# Patient Record
Sex: MALE | Race: WHITE | NOT HISPANIC OR LATINO
[De-identification: names, ages, dates, MRNs, and addresses within clinical notes are randomized per-mention and may not be internally consistent; named-entity substitution may affect disease eponyms.]

---

## 2017-10-25 ENCOUNTER — LABORATORY RESULT (OUTPATIENT)
Age: 67
End: 2017-10-25

## 2017-10-25 ENCOUNTER — APPOINTMENT (OUTPATIENT)
Dept: FAMILY MEDICINE | Facility: CLINIC | Age: 67
End: 2017-10-25
Payer: MEDICARE

## 2017-10-25 ENCOUNTER — NON-APPOINTMENT (OUTPATIENT)
Age: 67
End: 2017-10-25

## 2017-10-25 VITALS
SYSTOLIC BLOOD PRESSURE: 130 MMHG | OXYGEN SATURATION: 96 % | BODY MASS INDEX: 18.16 KG/M2 | HEIGHT: 73 IN | HEART RATE: 67 BPM | WEIGHT: 137 LBS | DIASTOLIC BLOOD PRESSURE: 76 MMHG

## 2017-10-25 DIAGNOSIS — Z85.118 PERSONAL HISTORY OF OTHER MALIGNANT NEOPLASM OF BRONCHUS AND LUNG: ICD-10-CM

## 2017-10-25 DIAGNOSIS — F10.10 ALCOHOL ABUSE, UNCOMPLICATED: ICD-10-CM

## 2017-10-25 DIAGNOSIS — Z87.898 PERSONAL HISTORY OF OTHER SPECIFIED CONDITIONS: ICD-10-CM

## 2017-10-25 DIAGNOSIS — Z82.49 FAMILY HISTORY OF ISCHEMIC HEART DISEASE AND OTHER DISEASES OF THE CIRCULATORY SYSTEM: ICD-10-CM

## 2017-10-25 DIAGNOSIS — Z13.21 ENCOUNTER FOR SCREENING FOR NUTRITIONAL DISORDER: ICD-10-CM

## 2017-10-25 DIAGNOSIS — Z86.69 PERSONAL HISTORY OF OTHER DISEASES OF THE NERVOUS SYSTEM AND SENSE ORGANS: ICD-10-CM

## 2017-10-25 PROCEDURE — 90688 IIV4 VACCINE SPLT 0.5 ML IM: CPT

## 2017-10-25 PROCEDURE — 36415 COLL VENOUS BLD VENIPUNCTURE: CPT

## 2017-10-25 PROCEDURE — 93000 ELECTROCARDIOGRAM COMPLETE: CPT

## 2017-10-25 PROCEDURE — G0008: CPT

## 2017-10-25 PROCEDURE — 99204 OFFICE O/P NEW MOD 45 MIN: CPT | Mod: 25

## 2017-10-26 ENCOUNTER — OTHER (OUTPATIENT)
Age: 67
End: 2017-10-26

## 2017-10-26 LAB
25(OH)D3 SERPL-MCNC: 34.9 NG/ML
ALBUMIN SERPL ELPH-MCNC: 4.7 G/DL
ALP BLD-CCNC: 145 U/L
ALT SERPL-CCNC: 11 U/L
ANION GAP SERPL CALC-SCNC: 16 MMOL/L
APPEARANCE: CLEAR
AST SERPL-CCNC: 18 U/L
BACTERIA: NEGATIVE
BASOPHILS # BLD AUTO: 0.02 K/UL
BASOPHILS NFR BLD AUTO: 0.3 %
BILIRUB SERPL-MCNC: 0.3 MG/DL
BILIRUBIN URINE: NEGATIVE
BLOOD URINE: NEGATIVE
BUN SERPL-MCNC: 8 MG/DL
CALCIUM SERPL-MCNC: 9.7 MG/DL
CHLORIDE SERPL-SCNC: 99 MMOL/L
CHOLEST SERPL-MCNC: 166 MG/DL
CHOLEST/HDLC SERPL: 2.8 RATIO
CO2 SERPL-SCNC: 22 MMOL/L
COLOR: YELLOW
CREAT SERPL-MCNC: 0.92 MG/DL
EOSINOPHIL # BLD AUTO: 0.11 K/UL
EOSINOPHIL NFR BLD AUTO: 1.5 %
GLUCOSE QUALITATIVE U: NEGATIVE MG/DL
GLUCOSE SERPL-MCNC: 103 MG/DL
HBA1C MFR BLD HPLC: 5.1 %
HCT VFR BLD CALC: 39.1 %
HDLC SERPL-MCNC: 60 MG/DL
HGB BLD-MCNC: 12.8 G/DL
HYALINE CASTS: 5 /LPF
IMM GRANULOCYTES NFR BLD AUTO: 0.3 %
KETONES URINE: NEGATIVE
LDLC SERPL CALC-MCNC: 77 MG/DL
LEUKOCYTE ESTERASE URINE: NEGATIVE
LYMPHOCYTES # BLD AUTO: 3.1 K/UL
LYMPHOCYTES NFR BLD AUTO: 43.4 %
MAN DIFF?: NORMAL
MCHC RBC-ENTMCNC: 32.7 GM/DL
MCHC RBC-ENTMCNC: 35.5 PG
MCV RBC AUTO: 108.3 FL
MICROSCOPIC-UA: NORMAL
MONOCYTES # BLD AUTO: 0.55 K/UL
MONOCYTES NFR BLD AUTO: 7.7 %
NEUTROPHILS # BLD AUTO: 3.35 K/UL
NEUTROPHILS NFR BLD AUTO: 46.8 %
NITRITE URINE: NEGATIVE
PH URINE: 5.5
PLATELET # BLD AUTO: 261 K/UL
POTASSIUM SERPL-SCNC: 4.8 MMOL/L
PROT SERPL-MCNC: 7.7 G/DL
PROTEIN URINE: NEGATIVE MG/DL
RBC # BLD: 3.61 M/UL
RBC # FLD: 14 %
RED BLOOD CELLS URINE: 1 /HPF
SODIUM SERPL-SCNC: 137 MMOL/L
SPECIFIC GRAVITY URINE: 1.02
SQUAMOUS EPITHELIAL CELLS: 0 /HPF
TRIGL SERPL-MCNC: 145 MG/DL
UROBILINOGEN URINE: NEGATIVE MG/DL
WBC # FLD AUTO: 7.15 K/UL
WHITE BLOOD CELLS URINE: 0 /HPF

## 2017-10-27 LAB
PSA FREE FLD-MCNC: 33.3
PSA FREE SERPL-MCNC: 0.57 NG/ML
PSA SERPL-MCNC: 1.71 NG/ML
TSH SERPL-ACNC: 1.24 UIU/ML

## 2017-11-03 ENCOUNTER — LABORATORY RESULT (OUTPATIENT)
Age: 67
End: 2017-11-03

## 2017-11-03 ENCOUNTER — APPOINTMENT (OUTPATIENT)
Dept: FAMILY MEDICINE | Facility: CLINIC | Age: 67
End: 2017-11-03
Payer: MEDICARE

## 2017-11-03 VITALS
OXYGEN SATURATION: 97 % | BODY MASS INDEX: 18.29 KG/M2 | HEART RATE: 76 BPM | SYSTOLIC BLOOD PRESSURE: 120 MMHG | HEIGHT: 73 IN | DIASTOLIC BLOOD PRESSURE: 70 MMHG | WEIGHT: 138 LBS | TEMPERATURE: 98 F

## 2017-11-03 PROCEDURE — 36415 COLL VENOUS BLD VENIPUNCTURE: CPT

## 2017-11-03 PROCEDURE — 99214 OFFICE O/P EST MOD 30 MIN: CPT | Mod: 25

## 2017-11-06 ENCOUNTER — APPOINTMENT (OUTPATIENT)
Dept: THORACIC SURGERY | Facility: CLINIC | Age: 67
End: 2017-11-06
Payer: MEDICARE

## 2017-11-06 VITALS
OXYGEN SATURATION: 95 % | RESPIRATION RATE: 16 BRPM | BODY MASS INDEX: 18.42 KG/M2 | DIASTOLIC BLOOD PRESSURE: 70 MMHG | WEIGHT: 139 LBS | HEIGHT: 73 IN | HEART RATE: 79 BPM | SYSTOLIC BLOOD PRESSURE: 108 MMHG

## 2017-11-06 PROCEDURE — 99205 OFFICE O/P NEW HI 60 MIN: CPT

## 2017-11-08 ENCOUNTER — OTHER (OUTPATIENT)
Age: 67
End: 2017-11-08

## 2017-11-08 ENCOUNTER — NON-APPOINTMENT (OUTPATIENT)
Age: 67
End: 2017-11-08

## 2017-11-08 ENCOUNTER — APPOINTMENT (OUTPATIENT)
Dept: CARDIOLOGY | Facility: CLINIC | Age: 67
End: 2017-11-08
Payer: MEDICARE

## 2017-11-08 VITALS
BODY MASS INDEX: 18.21 KG/M2 | SYSTOLIC BLOOD PRESSURE: 120 MMHG | WEIGHT: 138 LBS | OXYGEN SATURATION: 97 % | HEART RATE: 83 BPM | DIASTOLIC BLOOD PRESSURE: 79 MMHG

## 2017-11-08 LAB
ALP BLD-CCNC: 136 U/L
ALP BLD-CCNC: 142 U/L
ALP BONE CFR SERPL: 23 %
ALP INTEST CFR SERPL: 6 %
ALP LIVER CFR SERPL: 72 %
ALP MACRO CFR SERPL: 0 %
ALP PLAC CFR SERPL: 0 %
BASOPHILS # BLD AUTO: 0 K/UL
BASOPHILS NFR BLD AUTO: 0 %
EOSINOPHIL # BLD AUTO: 0.18 K/UL
EOSINOPHIL NFR BLD AUTO: 2.7 %
FERRITIN SERPL-MCNC: 66 NG/ML
FOLATE SERPL-MCNC: >20 NG/ML
HCT VFR BLD CALC: 37.3 %
HGB BLD-MCNC: 12.2 G/DL
IRON SATN MFR SERPL: 67 %
IRON SERPL-MCNC: 143 UG/DL
LYMPHOCYTES # BLD AUTO: 2.27 K/UL
LYMPHOCYTES NFR BLD AUTO: 33.6 %
MAN DIFF?: NORMAL
MCHC RBC-ENTMCNC: 32.7 GM/DL
MCHC RBC-ENTMCNC: 35.3 PG
MCV RBC AUTO: 107.8 FL
MONOCYTES # BLD AUTO: 0.36 K/UL
MONOCYTES NFR BLD AUTO: 5.3 %
NEUTROPHILS # BLD AUTO: 3.77 K/UL
NEUTROPHILS NFR BLD AUTO: 54.8 %
PLATELET # BLD AUTO: 221 K/UL
RBC # BLD: 3.46 M/UL
RBC # BLD: 3.46 M/UL
RBC # FLD: 14.5 %
RETICS # AUTO: 1.5 %
RETICS AGGREG/RBC NFR: 52.6 K/UL
TIBC SERPL-MCNC: 215 UG/DL
TRANSFERRIN SERPL-MCNC: 178 MG/DL
UIBC SERPL-MCNC: 72 UG/DL
VIT B12 SERPL-MCNC: 356 PG/ML
WBC # FLD AUTO: 6.76 K/UL

## 2017-11-08 PROCEDURE — 99204 OFFICE O/P NEW MOD 45 MIN: CPT | Mod: 25

## 2017-11-08 PROCEDURE — 93000 ELECTROCARDIOGRAM COMPLETE: CPT

## 2017-11-16 ENCOUNTER — APPOINTMENT (OUTPATIENT)
Dept: NEUROLOGY | Facility: CLINIC | Age: 67
End: 2017-11-16
Payer: MEDICARE

## 2017-11-16 VITALS
DIASTOLIC BLOOD PRESSURE: 76 MMHG | WEIGHT: 138 LBS | SYSTOLIC BLOOD PRESSURE: 132 MMHG | BODY MASS INDEX: 18.29 KG/M2 | HEIGHT: 73 IN

## 2017-11-16 PROCEDURE — 99204 OFFICE O/P NEW MOD 45 MIN: CPT

## 2017-11-27 ENCOUNTER — APPOINTMENT (OUTPATIENT)
Dept: FAMILY MEDICINE | Facility: CLINIC | Age: 67
End: 2017-11-27
Payer: MEDICARE

## 2017-11-27 VITALS
BODY MASS INDEX: 18.55 KG/M2 | HEART RATE: 78 BPM | SYSTOLIC BLOOD PRESSURE: 122 MMHG | DIASTOLIC BLOOD PRESSURE: 68 MMHG | WEIGHT: 140 LBS | OXYGEN SATURATION: 95 % | HEIGHT: 73 IN

## 2017-11-27 PROCEDURE — 99214 OFFICE O/P EST MOD 30 MIN: CPT

## 2017-12-05 ENCOUNTER — APPOINTMENT (OUTPATIENT)
Dept: CARDIOLOGY | Facility: CLINIC | Age: 67
End: 2017-12-05

## 2018-01-05 ENCOUNTER — OTHER (OUTPATIENT)
Age: 68
End: 2018-01-05

## 2018-01-10 ENCOUNTER — APPOINTMENT (OUTPATIENT)
Dept: CARDIOLOGY | Facility: CLINIC | Age: 68
End: 2018-01-10

## 2018-01-29 ENCOUNTER — APPOINTMENT (OUTPATIENT)
Dept: FAMILY MEDICINE | Facility: CLINIC | Age: 68
End: 2018-01-29

## 2018-05-17 ENCOUNTER — APPOINTMENT (OUTPATIENT)
Dept: NEUROLOGY | Facility: CLINIC | Age: 68
End: 2018-05-17
Payer: MEDICARE

## 2018-05-17 VITALS
DIASTOLIC BLOOD PRESSURE: 80 MMHG | HEIGHT: 73 IN | WEIGHT: 140 LBS | SYSTOLIC BLOOD PRESSURE: 124 MMHG | BODY MASS INDEX: 18.55 KG/M2

## 2018-05-17 PROCEDURE — 99213 OFFICE O/P EST LOW 20 MIN: CPT

## 2018-05-17 RX ORDER — PHENOBARBITAL 64.8 MG/1
64.8 TABLET ORAL TWICE DAILY
Qty: 60 | Refills: 5 | Status: COMPLETED | COMMUNITY
Start: 2017-10-25 | End: 2018-05-17

## 2018-11-21 ENCOUNTER — APPOINTMENT (OUTPATIENT)
Dept: NEUROLOGY | Facility: CLINIC | Age: 68
End: 2018-11-21
Payer: MEDICARE

## 2018-11-21 VITALS
WEIGHT: 140 LBS | HEIGHT: 73 IN | BODY MASS INDEX: 18.55 KG/M2 | DIASTOLIC BLOOD PRESSURE: 80 MMHG | SYSTOLIC BLOOD PRESSURE: 118 MMHG

## 2018-11-21 PROCEDURE — 99213 OFFICE O/P EST LOW 20 MIN: CPT

## 2018-11-21 NOTE — ASSESSMENT
[FreeTextEntry1] : This is a 67-year-old man with a history of brain tumor resection as a child.\par \par He remains on Dilantin and phenobarbital. I will continue his current doses.\par \par I will see him back in 6 months.

## 2018-11-21 NOTE — HISTORY OF PRESENT ILLNESS
[FreeTextEntry1] : I saw this patient in the office today.\par \par He describes a history of brain tumor resection as a child. He has been on antiseizure medications ever since then.\par He is currently on Dilantin 200 mg twice per day, and phenobarbital 64.8 mg twice per day. He takes folic acid supplementation.\par \par He is somewhat unclear as to when his last seizure was.\par \par The patient does not drive.\par \par He had seen a neurologist previously but reports that he is changing due to the fact that this previous neurologist did not take his insurance.\par \par

## 2018-11-21 NOTE — REVIEW OF SYSTEMS
[Feeling Tired] : feeling tired [Anxiety] : anxiety [As Noted in HPI] : as noted in HPI [Chest Pain] : chest pain [Palpitations] : palpitations [Joint Pain] : joint pain [Negative] : Heme/Lymph

## 2018-11-21 NOTE — DATA REVIEWED
[de-identified] : CT scan of the head performed on 10/1/16 was reviewed. This study demonstrated only chronic postoperative changes graded there was no enhancement to suggest recurrent tumor.

## 2018-11-21 NOTE — CONSULT LETTER
[Dear  ___] : Dear  [unfilled], [Courtesy Letter:] : I had the pleasure of seeing your patient, [unfilled], in my office today. [Sincerely,] : Sincerely, [FreeTextEntry3] : Kyle Bonilla MD.

## 2018-11-21 NOTE — PHYSICAL EXAM
[General Appearance - Alert] : alert [Oriented To Time, Place, And Person] : oriented to person, place, and time [Cranial Nerves Optic (II)] : visual acuity intact bilaterally,  visual fields full to confrontation, pupils equal round and reactive to light [Cranial Nerves Oculomotor (III)] : extraocular motion intact [Cranial Nerves Trigeminal (V)] : facial sensation intact symmetrically [Cranial Nerves Facial (VII)] : face symmetrical [Cranial Nerves Vestibulocochlear (VIII)] : hearing was intact bilaterally [Cranial Nerves Glossopharyngeal (IX)] : tongue and palate midline [Cranial Nerves Accessory (XI - Cranial And Spinal)] : head turning and shoulder shrug symmetric [Cranial Nerves Hypoglossal (XII)] : there was no tongue deviation with protrusion [Motor Tone] : muscle tone was normal in all four extremities [Motor Strength] : muscle strength was normal in all four extremities [Sensation Tactile Decrease] : light touch was intact [Sensation Pain / Temperature Decrease] : pain and temperature was intact [Sensation Vibration Decrease] : vibration was intact [Romberg's Sign] : a positive Romberg's sign was present [2+] : Patella left 2+ [PERRL With Normal Accommodation] : pupils were equal in size, round, reactive to light, with normal accommodation [Optic Disc Abnormality] : the optic disc were normal in size and color [Edema] : there was no peripheral edema [Involuntary Movements] : no involuntary movements were seen [FreeTextEntry1] : He has some difficulty with short-term memory. Remote memory is better preserved the [Dysarthria] : no dysarthria [Aphasia] : no dysphasia/aphasia [Coordination - Dysmetria Impaired Finger-to-Nose Bilateral] : not present [Plantar Reflex Right Only] : normal on the right [Plantar Reflex Left Only] : normal on the left [FreeTextEntry8] : The patient's balance is poor and he uses a cane for support.

## 2019-05-22 ENCOUNTER — MEDICATION RENEWAL (OUTPATIENT)
Age: 69
End: 2019-05-22

## 2019-05-22 ENCOUNTER — APPOINTMENT (OUTPATIENT)
Dept: NEUROLOGY | Facility: CLINIC | Age: 69
End: 2019-05-22
Payer: MEDICARE

## 2019-05-22 VITALS
WEIGHT: 140 LBS | SYSTOLIC BLOOD PRESSURE: 148 MMHG | HEIGHT: 73 IN | BODY MASS INDEX: 18.55 KG/M2 | DIASTOLIC BLOOD PRESSURE: 80 MMHG

## 2019-05-22 PROCEDURE — 99213 OFFICE O/P EST LOW 20 MIN: CPT

## 2019-05-22 NOTE — ASSESSMENT
[FreeTextEntry1] : This is a 68 year-old man with a history of brain tumor resection as a child.\par \par He remains on Dilantin and phenobarbital. I will continue his current doses.\par \par I will see him back in 6 months.

## 2019-05-22 NOTE — DATA REVIEWED
[de-identified] : CT scan of the head performed on 10/1/16 was reviewed. This study demonstrated only chronic postoperative changes graded there was no enhancement to suggest recurrent tumor.

## 2019-05-22 NOTE — HISTORY OF PRESENT ILLNESS
[FreeTextEntry1] : I saw this patient in the office today.\par \par As you recall, he described a history of brain tumor resection as a child. He has been on antiseizure medications ever since then.\par He is currently on Dilantin 200 mg twice per day, and phenobarbital 64.8 mg twice per day. He takes folic acid supplementation.\par \par He is somewhat unclear as to when his last seizure was.\par \par The patient does not drive.\par \par He had seen a neurologist previously but reported that he had to switch doctors due to the fact that this previous neurologist did not take his insurance.\par \par

## 2019-05-22 NOTE — CONSULT LETTER
[Dear  ___] : Dear  [unfilled], [Courtesy Letter:] : I had the pleasure of seeing your patient, [unfilled], in my office today. [Consult Closing:] : Thank you very much for allowing me to participate in the care of this patient.  If you have any questions, please do not hesitate to contact me. [Sincerely,] : Sincerely, [FreeTextEntry3] : Kyle Bonilla MD.

## 2019-05-22 NOTE — PHYSICAL EXAM
[General Appearance - Alert] : alert [General Appearance - In No Acute Distress] : in no acute distress [Oriented To Time, Place, And Person] : oriented to person, place, and time [Affect] : the affect was normal [Cranial Nerves Optic (II)] : visual acuity intact bilaterally,  visual fields full to confrontation, pupils equal round and reactive to light [Cranial Nerves Oculomotor (III)] : extraocular motion intact [Cranial Nerves Trigeminal (V)] : facial sensation intact symmetrically [Cranial Nerves Facial (VII)] : face symmetrical [Cranial Nerves Vestibulocochlear (VIII)] : hearing was intact bilaterally [Cranial Nerves Glossopharyngeal (IX)] : tongue and palate midline [Cranial Nerves Accessory (XI - Cranial And Spinal)] : head turning and shoulder shrug symmetric [Cranial Nerves Hypoglossal (XII)] : there was no tongue deviation with protrusion [Motor Tone] : muscle tone was normal in all four extremities [Motor Strength] : muscle strength was normal in all four extremities [Sensation Tactile Decrease] : light touch was intact [Sensation Pain / Temperature Decrease] : pain and temperature was intact [Sensation Vibration Decrease] : vibration was intact [Romberg's Sign] : a positive Romberg's sign was present [2+] : Patella left 2+ [PERRL With Normal Accommodation] : pupils were equal in size, round, reactive to light, with normal accommodation [Optic Disc Abnormality] : the optic disc were normal in size and color [Edema] : there was no peripheral edema [Involuntary Movements] : no involuntary movements were seen [FreeTextEntry1] : He has some difficulty with short-term memory. Remote memory is better preserved the [Dysarthria] : no dysarthria [Aphasia] : no dysphasia/aphasia [Coordination - Dysmetria Impaired Finger-to-Nose Bilateral] : not present [Plantar Reflex Right Only] : normal on the right [Plantar Reflex Left Only] : normal on the left [FreeTextEntry8] : The patient's balance is poor and he uses a cane for support.

## 2019-11-20 ENCOUNTER — APPOINTMENT (OUTPATIENT)
Dept: NEUROLOGY | Facility: CLINIC | Age: 69
End: 2019-11-20
Payer: MEDICARE

## 2019-11-20 VITALS
HEIGHT: 73 IN | BODY MASS INDEX: 18.55 KG/M2 | DIASTOLIC BLOOD PRESSURE: 80 MMHG | WEIGHT: 140 LBS | SYSTOLIC BLOOD PRESSURE: 132 MMHG

## 2019-11-20 PROCEDURE — 99213 OFFICE O/P EST LOW 20 MIN: CPT

## 2019-11-20 NOTE — DATA REVIEWED
[de-identified] : CT scan of the head performed on 10/1/16 was reviewed. This study demonstrated only chronic postoperative changes graded there was no enhancement to suggest recurrent tumor.

## 2019-11-20 NOTE — REVIEW OF SYSTEMS
[Feeling Tired] : feeling tired [Anxiety] : anxiety [Chest Pain] : chest pain [As Noted in HPI] : as noted in HPI [Palpitations] : palpitations [Joint Pain] : joint pain [Negative] : Heme/Lymph

## 2019-11-20 NOTE — PHYSICAL EXAM
[General Appearance - Alert] : alert [General Appearance - In No Acute Distress] : in no acute distress [Affect] : the affect was normal [Oriented To Time, Place, And Person] : oriented to person, place, and time [Cranial Nerves Optic (II)] : visual acuity intact bilaterally,  visual fields full to confrontation, pupils equal round and reactive to light [Cranial Nerves Oculomotor (III)] : extraocular motion intact [Cranial Nerves Trigeminal (V)] : facial sensation intact symmetrically [Cranial Nerves Facial (VII)] : face symmetrical [Cranial Nerves Glossopharyngeal (IX)] : tongue and palate midline [Cranial Nerves Accessory (XI - Cranial And Spinal)] : head turning and shoulder shrug symmetric [Cranial Nerves Vestibulocochlear (VIII)] : hearing was intact bilaterally [Cranial Nerves Hypoglossal (XII)] : there was no tongue deviation with protrusion [Motor Strength] : muscle strength was normal in all four extremities [Motor Tone] : muscle tone was normal in all four extremities [Sensation Tactile Decrease] : light touch was intact [Sensation Pain / Temperature Decrease] : pain and temperature was intact [Sensation Vibration Decrease] : vibration was intact [Romberg's Sign] : a positive Romberg's sign was present [2+] : Patella left 2+ [PERRL With Normal Accommodation] : pupils were equal in size, round, reactive to light, with normal accommodation [Optic Disc Abnormality] : the optic disc were normal in size and color [Edema] : there was no peripheral edema [Involuntary Movements] : no involuntary movements were seen [Dysarthria] : no dysarthria [FreeTextEntry1] : He has some difficulty with short-term memory. Remote memory is better preserved the [Aphasia] : no dysphasia/aphasia [Coordination - Dysmetria Impaired Finger-to-Nose Bilateral] : not present [Plantar Reflex Right Only] : normal on the right [FreeTextEntry8] : The patient's balance is poor and he uses a cane for support. [Plantar Reflex Left Only] : normal on the left

## 2020-03-16 ENCOUNTER — APPOINTMENT (OUTPATIENT)
Dept: FAMILY MEDICINE | Facility: CLINIC | Age: 70
End: 2020-03-16
Payer: MEDICARE

## 2020-03-16 ENCOUNTER — LABORATORY RESULT (OUTPATIENT)
Age: 70
End: 2020-03-16

## 2020-03-16 ENCOUNTER — NON-APPOINTMENT (OUTPATIENT)
Age: 70
End: 2020-03-16

## 2020-03-16 VITALS
DIASTOLIC BLOOD PRESSURE: 88 MMHG | TEMPERATURE: 98.5 F | HEART RATE: 81 BPM | BODY MASS INDEX: 19.75 KG/M2 | WEIGHT: 149 LBS | SYSTOLIC BLOOD PRESSURE: 132 MMHG | OXYGEN SATURATION: 98 % | HEIGHT: 73 IN

## 2020-03-16 DIAGNOSIS — R20.2 PARESTHESIA OF SKIN: ICD-10-CM

## 2020-03-16 PROCEDURE — 36415 COLL VENOUS BLD VENIPUNCTURE: CPT

## 2020-03-16 PROCEDURE — 99214 OFFICE O/P EST MOD 30 MIN: CPT | Mod: 25

## 2020-03-16 PROCEDURE — 93000 ELECTROCARDIOGRAM COMPLETE: CPT

## 2020-03-16 PROCEDURE — G0439: CPT | Mod: 25

## 2020-03-17 LAB
25(OH)D3 SERPL-MCNC: 41.5 NG/ML
ALBUMIN SERPL ELPH-MCNC: 4.8 G/DL
ALP BLD-CCNC: 127 U/L
ALT SERPL-CCNC: 14 U/L
ANION GAP SERPL CALC-SCNC: 11 MMOL/L
APPEARANCE: CLEAR
AST SERPL-CCNC: 22 U/L
BACTERIA: NEGATIVE
BASOPHILS # BLD AUTO: 0.04 K/UL
BASOPHILS NFR BLD AUTO: 0.5 %
BILIRUB SERPL-MCNC: 0.2 MG/DL
BILIRUBIN URINE: NEGATIVE
BLOOD URINE: NEGATIVE
BUN SERPL-MCNC: 14 MG/DL
CALCIUM SERPL-MCNC: 9.4 MG/DL
CHLORIDE SERPL-SCNC: 104 MMOL/L
CHOLEST SERPL-MCNC: 196 MG/DL
CHOLEST/HDLC SERPL: 2.4 RATIO
CO2 SERPL-SCNC: 23 MMOL/L
COLOR: YELLOW
CREAT SERPL-MCNC: 1 MG/DL
EOSINOPHIL # BLD AUTO: 0.09 K/UL
EOSINOPHIL NFR BLD AUTO: 1.1 %
ESTIMATED AVERAGE GLUCOSE: 91 MG/DL
FERRITIN SERPL-MCNC: 41 NG/ML
FOLATE SERPL-MCNC: >20 NG/ML
GLUCOSE QUALITATIVE U: NEGATIVE
GLUCOSE SERPL-MCNC: 96 MG/DL
HBA1C MFR BLD HPLC: 4.8 %
HCT VFR BLD CALC: 41.3 %
HDLC SERPL-MCNC: 82 MG/DL
HGB BLD-MCNC: 13.2 G/DL
HYALINE CASTS: 0 /LPF
IMM GRANULOCYTES NFR BLD AUTO: 0.5 %
IRON SATN MFR SERPL: 47 %
IRON SERPL-MCNC: 131 UG/DL
KETONES URINE: NEGATIVE
LDLC SERPL CALC-MCNC: 89 MG/DL
LEUKOCYTE ESTERASE URINE: NEGATIVE
LYMPHOCYTES # BLD AUTO: 2.41 K/UL
LYMPHOCYTES NFR BLD AUTO: 28.9 %
MAN DIFF?: NORMAL
MCHC RBC-ENTMCNC: 32 GM/DL
MCHC RBC-ENTMCNC: 35.3 PG
MCV RBC AUTO: 110.4 FL
MICROSCOPIC-UA: NORMAL
MONOCYTES # BLD AUTO: 0.58 K/UL
MONOCYTES NFR BLD AUTO: 7 %
NEUTROPHILS # BLD AUTO: 5.17 K/UL
NEUTROPHILS NFR BLD AUTO: 62 %
NITRITE URINE: NEGATIVE
PH URINE: 6
PLATELET # BLD AUTO: 253 K/UL
POTASSIUM SERPL-SCNC: 4.7 MMOL/L
PROT SERPL-MCNC: 7.4 G/DL
PROTEIN URINE: NEGATIVE
PSA FREE FLD-MCNC: 35 %
PSA FREE SERPL-MCNC: 0.81 NG/ML
PSA SERPL-MCNC: 2.34 NG/ML
RBC # BLD: 3.74 M/UL
RBC # FLD: 14.2 %
RED BLOOD CELLS URINE: 1 /HPF
SODIUM SERPL-SCNC: 139 MMOL/L
SPECIFIC GRAVITY URINE: 1.02
SQUAMOUS EPITHELIAL CELLS: 0 /HPF
TIBC SERPL-MCNC: 278 UG/DL
TRIGL SERPL-MCNC: 129 MG/DL
TSH SERPL-ACNC: 1.02 UIU/ML
UIBC SERPL-MCNC: 147 UG/DL
UROBILINOGEN URINE: NORMAL
VIT B12 SERPL-MCNC: 317 PG/ML
WBC # FLD AUTO: 8.33 K/UL
WHITE BLOOD CELLS URINE: 0 /HPF

## 2020-05-20 ENCOUNTER — RX RENEWAL (OUTPATIENT)
Age: 70
End: 2020-05-20

## 2020-05-20 ENCOUNTER — APPOINTMENT (OUTPATIENT)
Dept: NEUROLOGY | Facility: CLINIC | Age: 70
End: 2020-05-20
Payer: MEDICARE

## 2020-05-20 PROCEDURE — 99441: CPT

## 2020-05-20 NOTE — HISTORY OF PRESENT ILLNESS
[Home] : at home, [unfilled] , at the time of the visit. [Medical Office: ([facility])___] : at the medical office located in  [Verbal consent obtained from patient] : the patient, [unfilled] [Time Spent: ___ minutes] : I have spent [unfilled] minutes with the patient on the telephone [FreeTextEntry1] : Verbal consent was obtained for telehealth visit. \par \par Name of person consent is being obtained from: Daniel\par Relationship to patient: self\par Name of witness if available: \par \par Patient Location: Home\par Provider location: Office\par Visit participants: Patient and provider (Neurologist).\par Other participants (family member,care giver, etc): \par \par I spoke with the patient by telephone today.\par \par He has a history of brain tumor resection as a child and has been on antiepileptic medication since then.\par He is currently on Dilantin 200 mg twice per day, and phenobarbital 64.8 mg twice per day.\par He takes folic acid supplementation.\par \par He has had no seizures since his last visit.\par \par I advised him to continue his current regimen.\par \par I have explained that should refills of medication be required I will be available by telephone.\par I also advised the patient to contact me with any questions or concerns.\par I further explained that a followup visit will be arranged once the current global crisis has abated.

## 2020-08-01 ENCOUNTER — INPATIENT (INPATIENT)
Facility: HOSPITAL | Age: 70
LOS: 3 days | Discharge: HOME | DRG: 643 | End: 2020-08-05
Attending: HOSPITALIST | Admitting: EMERGENCY MEDICINE
Payer: MEDICARE

## 2020-08-01 ENCOUNTER — TRANSCRIPTION ENCOUNTER (OUTPATIENT)
Age: 70
End: 2020-08-01

## 2020-08-01 VITALS
SYSTOLIC BLOOD PRESSURE: 143 MMHG | HEIGHT: 72 IN | RESPIRATION RATE: 18 BRPM | OXYGEN SATURATION: 99 % | TEMPERATURE: 103 F | HEART RATE: 83 BPM | DIASTOLIC BLOOD PRESSURE: 95 MMHG | WEIGHT: 149.91 LBS

## 2020-08-01 DIAGNOSIS — E87.1 HYPO-OSMOLALITY AND HYPONATREMIA: ICD-10-CM

## 2020-08-01 DIAGNOSIS — Z98.89 OTHER SPECIFIED POSTPROCEDURAL STATES: Chronic | ICD-10-CM

## 2020-08-01 LAB
ALBUMIN SERPL ELPH-MCNC: 3.6 G/DL — SIGNIFICANT CHANGE UP (ref 3.3–5.2)
ALBUMIN SERPL ELPH-MCNC: 3.8 G/DL — SIGNIFICANT CHANGE UP (ref 3.3–5.2)
ALP SERPL-CCNC: 103 U/L — SIGNIFICANT CHANGE UP (ref 40–120)
ALP SERPL-CCNC: 108 U/L — SIGNIFICANT CHANGE UP (ref 40–120)
ALT FLD-CCNC: 22 U/L — SIGNIFICANT CHANGE UP
ALT FLD-CCNC: 24 U/L — SIGNIFICANT CHANGE UP
ANION GAP SERPL CALC-SCNC: 12 MMOL/L — SIGNIFICANT CHANGE UP (ref 5–17)
ANION GAP SERPL CALC-SCNC: 14 MMOL/L — SIGNIFICANT CHANGE UP (ref 5–17)
APTT BLD: 25.9 SEC — LOW (ref 27.5–35.5)
AST SERPL-CCNC: 34 U/L — SIGNIFICANT CHANGE UP
AST SERPL-CCNC: 34 U/L — SIGNIFICANT CHANGE UP
BASOPHILS # BLD AUTO: 0.01 K/UL — SIGNIFICANT CHANGE UP (ref 0–0.2)
BASOPHILS NFR BLD AUTO: 0.1 % — SIGNIFICANT CHANGE UP (ref 0–2)
BILIRUB SERPL-MCNC: 0.2 MG/DL — LOW (ref 0.4–2)
BILIRUB SERPL-MCNC: 0.2 MG/DL — LOW (ref 0.4–2)
BUN SERPL-MCNC: 8 MG/DL — SIGNIFICANT CHANGE UP (ref 8–20)
BUN SERPL-MCNC: 8 MG/DL — SIGNIFICANT CHANGE UP (ref 8–20)
CALCIUM SERPL-MCNC: 8.3 MG/DL — LOW (ref 8.6–10.2)
CALCIUM SERPL-MCNC: 8.5 MG/DL — LOW (ref 8.6–10.2)
CHLORIDE SERPL-SCNC: 86 MMOL/L — LOW (ref 98–107)
CHLORIDE SERPL-SCNC: 86 MMOL/L — LOW (ref 98–107)
CK MB CFR SERPL CALC: 2.1 NG/ML — SIGNIFICANT CHANGE UP (ref 0–6.7)
CK SERPL-CCNC: 310 U/L — HIGH (ref 30–200)
CO2 SERPL-SCNC: 22 MMOL/L — SIGNIFICANT CHANGE UP (ref 22–29)
CO2 SERPL-SCNC: 24 MMOL/L — SIGNIFICANT CHANGE UP (ref 22–29)
CREAT ?TM UR-MCNC: 116 MG/DL — SIGNIFICANT CHANGE UP
CREAT SERPL-MCNC: 0.76 MG/DL — SIGNIFICANT CHANGE UP (ref 0.5–1.3)
CREAT SERPL-MCNC: 0.77 MG/DL — SIGNIFICANT CHANGE UP (ref 0.5–1.3)
CRP SERPL-MCNC: 32.65 MG/DL — HIGH (ref 0–0.4)
D DIMER BLD IA.RAPID-MCNC: 419 NG/ML DDU — HIGH
EOSINOPHIL # BLD AUTO: 0 K/UL — SIGNIFICANT CHANGE UP (ref 0–0.5)
EOSINOPHIL NFR BLD AUTO: 0 % — SIGNIFICANT CHANGE UP (ref 0–6)
FERRITIN SERPL-MCNC: 202 NG/ML — SIGNIFICANT CHANGE UP (ref 30–400)
FIBRINOGEN PPP-MCNC: 1008 MG/DL — CRITICAL HIGH (ref 290–520)
GLUCOSE SERPL-MCNC: 114 MG/DL — HIGH (ref 70–99)
GLUCOSE SERPL-MCNC: 140 MG/DL — HIGH (ref 70–99)
HCT VFR BLD CALC: 31.9 % — LOW (ref 39–50)
HGB BLD-MCNC: 11 G/DL — LOW (ref 13–17)
IMM GRANULOCYTES NFR BLD AUTO: 0.7 % — SIGNIFICANT CHANGE UP (ref 0–1.5)
INR BLD: 1.25 RATIO — HIGH (ref 0.88–1.16)
LACTATE BLDV-MCNC: 1 MMOL/L — SIGNIFICANT CHANGE UP (ref 0.5–2)
LDH SERPL L TO P-CCNC: 210 U/L — HIGH (ref 98–192)
LYMPHOCYTES # BLD AUTO: 0.56 K/UL — LOW (ref 1–3.3)
LYMPHOCYTES # BLD AUTO: 8.3 % — LOW (ref 13–44)
MCHC RBC-ENTMCNC: 34.5 GM/DL — SIGNIFICANT CHANGE UP (ref 32–36)
MCHC RBC-ENTMCNC: 35.8 PG — HIGH (ref 27–34)
MCV RBC AUTO: 103.9 FL — HIGH (ref 80–100)
MONOCYTES # BLD AUTO: 0.63 K/UL — SIGNIFICANT CHANGE UP (ref 0–0.9)
MONOCYTES NFR BLD AUTO: 9.3 % — SIGNIFICANT CHANGE UP (ref 2–14)
NEUTROPHILS # BLD AUTO: 5.53 K/UL — SIGNIFICANT CHANGE UP (ref 1.8–7.4)
NEUTROPHILS NFR BLD AUTO: 81.6 % — HIGH (ref 43–77)
PLATELET # BLD AUTO: 210 K/UL — SIGNIFICANT CHANGE UP (ref 150–400)
POTASSIUM SERPL-MCNC: 3.8 MMOL/L — SIGNIFICANT CHANGE UP (ref 3.5–5.3)
POTASSIUM SERPL-MCNC: 3.9 MMOL/L — SIGNIFICANT CHANGE UP (ref 3.5–5.3)
POTASSIUM SERPL-SCNC: 3.8 MMOL/L — SIGNIFICANT CHANGE UP (ref 3.5–5.3)
POTASSIUM SERPL-SCNC: 3.9 MMOL/L — SIGNIFICANT CHANGE UP (ref 3.5–5.3)
PROCALCITONIN SERPL-MCNC: 0.54 NG/ML — HIGH (ref 0.02–0.1)
PROT SERPL-MCNC: 6.6 G/DL — SIGNIFICANT CHANGE UP (ref 6.6–8.7)
PROT SERPL-MCNC: 6.8 G/DL — SIGNIFICANT CHANGE UP (ref 6.6–8.7)
PROTHROM AB SERPL-ACNC: 14.3 SEC — HIGH (ref 10.6–13.6)
RBC # BLD: 3.07 M/UL — LOW (ref 4.2–5.8)
RBC # FLD: 12.6 % — SIGNIFICANT CHANGE UP (ref 10.3–14.5)
SODIUM SERPL-SCNC: 122 MMOL/L — LOW (ref 135–145)
SODIUM SERPL-SCNC: 122 MMOL/L — LOW (ref 135–145)
SODIUM UR-SCNC: <30 MMOL/L — SIGNIFICANT CHANGE UP
TROPONIN T SERPL-MCNC: <0.01 NG/ML — SIGNIFICANT CHANGE UP (ref 0–0.06)
TSH SERPL-MCNC: 0.39 UIU/ML — SIGNIFICANT CHANGE UP (ref 0.27–4.2)
WBC # BLD: 6.78 K/UL — SIGNIFICANT CHANGE UP (ref 3.8–10.5)
WBC # FLD AUTO: 6.78 K/UL — SIGNIFICANT CHANGE UP (ref 3.8–10.5)

## 2020-08-01 PROCEDURE — 93970 EXTREMITY STUDY: CPT | Mod: 26

## 2020-08-01 PROCEDURE — 71275 CT ANGIOGRAPHY CHEST: CPT | Mod: 26

## 2020-08-01 PROCEDURE — 70450 CT HEAD/BRAIN W/O DYE: CPT | Mod: 26

## 2020-08-01 PROCEDURE — 99285 EMERGENCY DEPT VISIT HI MDM: CPT

## 2020-08-01 PROCEDURE — 93010 ELECTROCARDIOGRAM REPORT: CPT

## 2020-08-01 PROCEDURE — 71045 X-RAY EXAM CHEST 1 VIEW: CPT | Mod: 26

## 2020-08-01 RX ORDER — METOCLOPRAMIDE HCL 10 MG
10 TABLET ORAL ONCE
Refills: 0 | Status: COMPLETED | OUTPATIENT
Start: 2020-08-01 | End: 2020-08-01

## 2020-08-01 RX ORDER — SODIUM CHLORIDE 9 MG/ML
1000 INJECTION INTRAMUSCULAR; INTRAVENOUS; SUBCUTANEOUS
Refills: 0 | Status: DISCONTINUED | OUTPATIENT
Start: 2020-08-01 | End: 2020-08-02

## 2020-08-01 RX ORDER — ACETAMINOPHEN 500 MG
650 TABLET ORAL ONCE
Refills: 0 | Status: COMPLETED | OUTPATIENT
Start: 2020-08-01 | End: 2020-08-01

## 2020-08-01 RX ORDER — DESMOPRESSIN ACETATE 0.1 MG/1
2 TABLET ORAL ONCE
Refills: 0 | Status: COMPLETED | OUTPATIENT
Start: 2020-08-01 | End: 2020-08-01

## 2020-08-01 RX ORDER — SODIUM CHLORIDE 9 MG/ML
1000 INJECTION INTRAMUSCULAR; INTRAVENOUS; SUBCUTANEOUS ONCE
Refills: 0 | Status: COMPLETED | OUTPATIENT
Start: 2020-08-01 | End: 2020-08-01

## 2020-08-01 RX ORDER — IPRATROPIUM BROMIDE 0.2 MG/ML
1 SOLUTION, NON-ORAL INHALATION ONCE
Refills: 0 | Status: COMPLETED | OUTPATIENT
Start: 2020-08-01 | End: 2020-08-01

## 2020-08-01 RX ORDER — ALBUTEROL 90 UG/1
2 AEROSOL, METERED ORAL ONCE
Refills: 0 | Status: COMPLETED | OUTPATIENT
Start: 2020-08-01 | End: 2020-08-01

## 2020-08-01 RX ADMIN — ALBUTEROL 2 PUFF(S): 90 AEROSOL, METERED ORAL at 18:53

## 2020-08-01 RX ADMIN — SODIUM CHLORIDE 1000 MILLILITER(S): 9 INJECTION INTRAMUSCULAR; INTRAVENOUS; SUBCUTANEOUS at 18:53

## 2020-08-01 RX ADMIN — SODIUM CHLORIDE 150 MILLILITER(S): 9 INJECTION INTRAMUSCULAR; INTRAVENOUS; SUBCUTANEOUS at 21:04

## 2020-08-01 RX ADMIN — Medication 650 MILLIGRAM(S): at 19:50

## 2020-08-01 RX ADMIN — Medication 650 MILLIGRAM(S): at 18:52

## 2020-08-01 RX ADMIN — Medication 1 PUFF(S): at 18:53

## 2020-08-01 RX ADMIN — Medication 125 MILLIGRAM(S): at 18:52

## 2020-08-01 RX ADMIN — DESMOPRESSIN ACETATE 2 MICROGRAM(S): 0.1 TABLET ORAL at 21:04

## 2020-08-01 RX ADMIN — Medication 10 MILLIGRAM(S): at 18:52

## 2020-08-01 NOTE — ED ADULT NURSE NOTE - ED CARDIAC RHYTHM
----- Message from Clare Mijares sent at 11/26/2019 11:36 AM CST -----  Contact: [phone]/self   Patient is requesting to speak with you regarding a referral and scheduling an appt. Please call and advise.    11/26/19  11:47am  Spoke to patient regarding above message. Patient scheduled as requested. Verbalized understanding.    regular

## 2020-08-01 NOTE — ED ADULT NURSE NOTE - CHIEF COMPLAINT QUOTE
Pt BIBA from [facility] for fever since last night with increasing AMS per pt's wife, EMS states pt SpO2 in RA 89%, SpO2 99% on 6L NC. Pt states "I went to [facility] for hiccups that i've had for a week". Also states c/o chills for 1w.

## 2020-08-01 NOTE — ED ADULT NURSE NOTE - OBJECTIVE STATEMENT
c/o fever, hypoxic on room air placed on 2L nasal o2 with improvement to >92%. c/o fever, hypoxic on room air placed on 6L nasal o2 with improvement to >92%.

## 2020-08-01 NOTE — ED ADULT TRIAGE NOTE - CHIEF COMPLAINT QUOTE
Pt BIBA from [facility] for fever with increasing AMS per pt's wife, EMS states pt SpO2 in RA 89%, SpO2 99% on 6L NC. Pt states "I went to [facility] for hiccups that i've had for a week". Also states c/o chills for 1w. Pt BIBA from [facility] for fever since last night with increasing AMS per pt's wife, EMS states pt SpO2 in RA 89%, SpO2 99% on 6L NC. Pt states "I went to [facility] for hiccups that i've had for a week". Also states c/o chills for 1w.

## 2020-08-01 NOTE — ED PROVIDER NOTE - CLINICAL SUMMARY MEDICAL DECISION MAKING FREE TEXT BOX
68 yo male presenting with fever and hypoxia. R/o COVID. patient not in respiratory distress. Saturating in upper 90s on 6L of O2. History of lung mass and seizures. Leg pain .CT head, CTA chest, Doppler US of legs, CBC, CMP, d-dimer, Coags, Trop, COVID, VBG

## 2020-08-01 NOTE — ED PROVIDER NOTE - PHYSICAL EXAMINATION
CONSTITUTIONAL: Patient is on O2 oxygen, breathing conformably, saturation is in the upper 90s  SKIN: skin exam is warm and dry, no acute rash.  HEAD: Normocephalic; atraumatic.  EYES:  conjunctiva and sclera clear.  ENT: No nasal discharge; airway clear.  NECK: Supple; non tender. + full passive ROM in all directions.  CARD: S1, S2 normal; no murmurs, gallops, or rubs. Regular rate and rhythm. + Symmetric Strong Pulses  RESP: No wheezes, rales or rhonchi. Good air movement bilaterally  ABD: soft; non-distended; non-tender. No Rebound, No Guarding, No signs of peritonitis  EXT: Normal ROM. No clubbing, cyanosis or edema. Pt Pulses intact.

## 2020-08-01 NOTE — ED PROVIDER NOTE - NS ED ROS FT
Constitutional: See HPI.  Eyes: No visual changes  ENMT: No hearing changes, neck pain/stiffness  Cardiac: No SOB or edema. No chest pain  Respiratory: Endorses hiccups. No cough or difficulty breathing  GI: No nausea, vomiting, or abdominal pain.  MS: Endorses leg pain, muscle weakness  Neuro: No headache or weakness  Except as documented in the HPI, all other systems are negative.

## 2020-08-01 NOTE — ED PROVIDER NOTE - CARE PLAN
Principal Discharge DX:	Hyponatremia syndrome  Secondary Diagnosis:	Hypoxia  Secondary Diagnosis:	Delirium

## 2020-08-01 NOTE — ED PROVIDER NOTE - PROGRESS NOTE DETAILS
pt found to have low Na of 122, given desmopressin, fluids and ordered th, cortisol and urine lytes and creatinine per protocol, will order repeat cmp as well, pt had no dvt, pt admittted to medicine for hypponatremia, with covid like illness ausing hypoxia, cta chest and ct head pending, medicine will follow, spoke to Dr. Berg

## 2020-08-01 NOTE — ED PROVIDER NOTE - ATTENDING CONTRIBUTION TO CARE
Santa MUELLER- 70 Y/O M with h/o smoking, lung mass, seizure disorder sent from [facility] for fever and hypoxia to r/o covid, Pt had fever for last night and states that he has been having hiccups for 7 days and that was the prime reason for his  health visit. Pt also states that he had accident as child and his legs are weak and walks with cane and lately, he is not walking much and his legs have more pain. Pt denies any sob, chest pain. Pt states hiccups resolve when he sleeps    Pt is alert, well appearing male, s1s2 normal reg, b/l clear breath sounds, abd soft, nt, nd, normal bowel sounds, no cvat, neuro exam aox3, cn 2-12 intact, all 4 ext pulses normal, normal skin color, no calf tenderness, pt hypoxic to 88 w/o oxygen and improves on 99% on 6 L nasal cannula    Plan to check labs, cxr, r/o  covid pe, dvt, pna. Will treat like copd given undiagnosed, pt poor historian, does not endorse lung mas and seizure history which was discovered on prior records

## 2020-08-01 NOTE — ED ADULT NURSE REASSESSMENT NOTE - NS ED NURSE REASSESS COMMENT FT1
pt reports sob is feeling better, nasal o2 titrated down to 3L and maintained spo2 >92%.  temp trending down, maintenance fluids infusing well, vitals stable, no acute distress noted, pending admission, comfort/safety maintained, will cont to monitor Kelly RN

## 2020-08-01 NOTE — ED PROVIDER NOTE - OBJECTIVE STATEMENT
70 yo male with PMH of seizure and lung mass presents for fever and hypoxia found at urgent care. Patient endorses leg pain. History of trauma to the legs as a child. Denies SOB, chest pain, weakness edema. Reports that he has had hiccups for a week. Denies N/V.    History received from wife by phone. Patient had hiccups for the past 3 days. Reports that the patient developed a fever of 102 and that he was not acting like himself. Sleeping more than usual and not eating. Report that patient has been complaining of new leg pain despite the childhood trauma he had that required surgery.

## 2020-08-02 LAB
ANION GAP SERPL CALC-SCNC: 13 MMOL/L — SIGNIFICANT CHANGE UP (ref 5–17)
ANION GAP SERPL CALC-SCNC: 14 MMOL/L — SIGNIFICANT CHANGE UP (ref 5–17)
ANION GAP SERPL CALC-SCNC: 14 MMOL/L — SIGNIFICANT CHANGE UP (ref 5–17)
APPEARANCE UR: CLEAR — SIGNIFICANT CHANGE UP
BACTERIA # UR AUTO: ABNORMAL
BILIRUB UR-MCNC: NEGATIVE — SIGNIFICANT CHANGE UP
BUN SERPL-MCNC: 12 MG/DL — SIGNIFICANT CHANGE UP (ref 8–20)
BUN SERPL-MCNC: 8 MG/DL — SIGNIFICANT CHANGE UP (ref 8–20)
BUN SERPL-MCNC: 9 MG/DL — SIGNIFICANT CHANGE UP (ref 8–20)
CALCIUM SERPL-MCNC: 7.8 MG/DL — LOW (ref 8.6–10.2)
CALCIUM SERPL-MCNC: 8.3 MG/DL — LOW (ref 8.6–10.2)
CALCIUM SERPL-MCNC: 8.3 MG/DL — LOW (ref 8.6–10.2)
CHLORIDE SERPL-SCNC: 89 MMOL/L — LOW (ref 98–107)
CHLORIDE SERPL-SCNC: 90 MMOL/L — LOW (ref 98–107)
CHLORIDE SERPL-SCNC: 92 MMOL/L — LOW (ref 98–107)
CO2 SERPL-SCNC: 19 MMOL/L — LOW (ref 22–29)
CO2 SERPL-SCNC: 21 MMOL/L — LOW (ref 22–29)
CO2 SERPL-SCNC: 22 MMOL/L — SIGNIFICANT CHANGE UP (ref 22–29)
COLOR SPEC: YELLOW — SIGNIFICANT CHANGE UP
CREAT SERPL-MCNC: 0.62 MG/DL — SIGNIFICANT CHANGE UP (ref 0.5–1.3)
CREAT SERPL-MCNC: 0.67 MG/DL — SIGNIFICANT CHANGE UP (ref 0.5–1.3)
CREAT SERPL-MCNC: 0.69 MG/DL — SIGNIFICANT CHANGE UP (ref 0.5–1.3)
DIFF PNL FLD: ABNORMAL
EPI CELLS # UR: SIGNIFICANT CHANGE UP
FOLATE SERPL-MCNC: >20 NG/ML — SIGNIFICANT CHANGE UP
GLUCOSE SERPL-MCNC: 110 MG/DL — HIGH (ref 70–99)
GLUCOSE SERPL-MCNC: 124 MG/DL — HIGH (ref 70–99)
GLUCOSE SERPL-MCNC: 95 MG/DL — SIGNIFICANT CHANGE UP (ref 70–99)
GLUCOSE UR QL: NEGATIVE MG/DL — SIGNIFICANT CHANGE UP
KETONES UR-MCNC: ABNORMAL
LEUKOCYTE ESTERASE UR-ACNC: NEGATIVE — SIGNIFICANT CHANGE UP
MAGNESIUM SERPL-MCNC: 1.9 MG/DL — SIGNIFICANT CHANGE UP (ref 1.6–2.6)
MAGNESIUM SERPL-MCNC: 1.9 MG/DL — SIGNIFICANT CHANGE UP (ref 1.6–2.6)
NITRITE UR-MCNC: NEGATIVE — SIGNIFICANT CHANGE UP
OSMOLALITY SERPL: 273 MOSMOL/KG — LOW (ref 280–301)
OSMOLALITY UR: 492 MOSM/KG — SIGNIFICANT CHANGE UP (ref 300–1000)
PH UR: 6.5 — SIGNIFICANT CHANGE UP (ref 5–8)
PHENOBARB SERPL-MCNC: 21.8 UG/ML — SIGNIFICANT CHANGE UP (ref 15–40)
PHENYTOIN FREE SERPL-MCNC: 8.3 UG/ML — LOW (ref 10–20)
PHOSPHATE SERPL-MCNC: 2.5 MG/DL — SIGNIFICANT CHANGE UP (ref 2.4–4.7)
PHOSPHATE SERPL-MCNC: 2.8 MG/DL — SIGNIFICANT CHANGE UP (ref 2.4–4.7)
POTASSIUM SERPL-MCNC: 3.4 MMOL/L — LOW (ref 3.5–5.3)
POTASSIUM SERPL-MCNC: 3.6 MMOL/L — SIGNIFICANT CHANGE UP (ref 3.5–5.3)
POTASSIUM SERPL-MCNC: 4.3 MMOL/L — SIGNIFICANT CHANGE UP (ref 3.5–5.3)
POTASSIUM SERPL-SCNC: 3.4 MMOL/L — LOW (ref 3.5–5.3)
POTASSIUM SERPL-SCNC: 3.6 MMOL/L — SIGNIFICANT CHANGE UP (ref 3.5–5.3)
POTASSIUM SERPL-SCNC: 4.3 MMOL/L — SIGNIFICANT CHANGE UP (ref 3.5–5.3)
PROT UR-MCNC: 30 MG/DL
RBC CASTS # UR COMP ASSIST: ABNORMAL /HPF (ref 0–4)
SARS-COV-2 IGG SERPL QL IA: NEGATIVE — SIGNIFICANT CHANGE UP
SARS-COV-2 IGM SERPL IA-ACNC: 0.08 INDEX — SIGNIFICANT CHANGE UP
SARS-COV-2 RNA SPEC QL NAA+PROBE: SIGNIFICANT CHANGE UP
SARS-COV-2 RNA SPEC QL NAA+PROBE: SIGNIFICANT CHANGE UP
SODIUM SERPL-SCNC: 124 MMOL/L — LOW (ref 135–145)
SODIUM SERPL-SCNC: 125 MMOL/L — LOW (ref 135–145)
SODIUM SERPL-SCNC: 125 MMOL/L — LOW (ref 135–145)
SODIUM UR-SCNC: 53 MMOL/L — SIGNIFICANT CHANGE UP
SP GR SPEC: 1.01 — SIGNIFICANT CHANGE UP (ref 1.01–1.02)
URATE SERPL-MCNC: 3.5 MG/DL — SIGNIFICANT CHANGE UP (ref 3.4–7)
UROBILINOGEN FLD QL: NEGATIVE MG/DL — SIGNIFICANT CHANGE UP
VIT B12 SERPL-MCNC: 980 PG/ML — SIGNIFICANT CHANGE UP (ref 232–1245)
WBC UR QL: SIGNIFICANT CHANGE UP

## 2020-08-02 PROCEDURE — 99223 1ST HOSP IP/OBS HIGH 75: CPT

## 2020-08-02 RX ORDER — ENOXAPARIN SODIUM 100 MG/ML
40 INJECTION SUBCUTANEOUS EVERY 12 HOURS
Refills: 0 | Status: DISCONTINUED | OUTPATIENT
Start: 2020-08-02 | End: 2020-08-03

## 2020-08-02 RX ORDER — PHENOBARBITAL 60 MG
64.8 TABLET ORAL EVERY 12 HOURS
Refills: 0 | Status: DISCONTINUED | OUTPATIENT
Start: 2020-08-02 | End: 2020-08-05

## 2020-08-02 RX ORDER — ACETAMINOPHEN 500 MG
650 TABLET ORAL EVERY 6 HOURS
Refills: 0 | Status: DISCONTINUED | OUTPATIENT
Start: 2020-08-02 | End: 2020-08-05

## 2020-08-02 RX ORDER — CHLORPROMAZINE HCL 10 MG
25 TABLET ORAL THREE TIMES A DAY
Refills: 0 | Status: COMPLETED | OUTPATIENT
Start: 2020-08-02 | End: 2020-08-05

## 2020-08-02 RX ORDER — TIOTROPIUM BROMIDE 18 UG/1
1 CAPSULE ORAL; RESPIRATORY (INHALATION) DAILY
Refills: 0 | Status: DISCONTINUED | OUTPATIENT
Start: 2020-08-02 | End: 2020-08-05

## 2020-08-02 RX ORDER — SODIUM CHLORIDE 9 MG/ML
1000 INJECTION INTRAMUSCULAR; INTRAVENOUS; SUBCUTANEOUS
Refills: 0 | Status: DISCONTINUED | OUTPATIENT
Start: 2020-08-02 | End: 2020-08-05

## 2020-08-02 RX ORDER — NICOTINE POLACRILEX 2 MG
1 GUM BUCCAL DAILY
Refills: 0 | Status: DISCONTINUED | OUTPATIENT
Start: 2020-08-02 | End: 2020-08-05

## 2020-08-02 RX ORDER — ALBUTEROL 90 UG/1
2 AEROSOL, METERED ORAL EVERY 6 HOURS
Refills: 0 | Status: DISCONTINUED | OUTPATIENT
Start: 2020-08-02 | End: 2020-08-05

## 2020-08-02 RX ORDER — FOLIC ACID 0.8 MG
1 TABLET ORAL DAILY
Refills: 0 | Status: DISCONTINUED | OUTPATIENT
Start: 2020-08-02 | End: 2020-08-05

## 2020-08-02 RX ORDER — MAGNESIUM SULFATE 500 MG/ML
1 VIAL (ML) INJECTION ONCE
Refills: 0 | Status: COMPLETED | OUTPATIENT
Start: 2020-08-02 | End: 2020-08-02

## 2020-08-02 RX ADMIN — Medication 650 MILLIGRAM(S): at 16:19

## 2020-08-02 RX ADMIN — Medication 64.8 MILLIGRAM(S): at 06:16

## 2020-08-02 RX ADMIN — Medication 200 MILLIGRAM(S): at 06:16

## 2020-08-02 RX ADMIN — Medication 25 MILLIGRAM(S): at 21:14

## 2020-08-02 RX ADMIN — Medication 25 MILLIGRAM(S): at 13:05

## 2020-08-02 RX ADMIN — SODIUM CHLORIDE 75 MILLILITER(S): 9 INJECTION INTRAMUSCULAR; INTRAVENOUS; SUBCUTANEOUS at 04:38

## 2020-08-02 RX ADMIN — Medication 1 MILLIGRAM(S): at 17:29

## 2020-08-02 RX ADMIN — Medication 100 GRAM(S): at 05:45

## 2020-08-02 RX ADMIN — Medication 64.8 MILLIGRAM(S): at 17:29

## 2020-08-02 RX ADMIN — Medication 200 MILLIGRAM(S): at 18:21

## 2020-08-02 RX ADMIN — TIOTROPIUM BROMIDE 1 CAPSULE(S): 18 CAPSULE ORAL; RESPIRATORY (INHALATION) at 09:30

## 2020-08-02 RX ADMIN — Medication 650 MILLIGRAM(S): at 06:16

## 2020-08-02 RX ADMIN — ENOXAPARIN SODIUM 40 MILLIGRAM(S): 100 INJECTION SUBCUTANEOUS at 17:29

## 2020-08-02 RX ADMIN — ENOXAPARIN SODIUM 40 MILLIGRAM(S): 100 INJECTION SUBCUTANEOUS at 06:15

## 2020-08-02 RX ADMIN — SODIUM CHLORIDE 75 MILLILITER(S): 9 INJECTION INTRAMUSCULAR; INTRAVENOUS; SUBCUTANEOUS at 13:27

## 2020-08-02 NOTE — H&P ADULT - HISTORY OF PRESENT ILLNESS
68 y/o M with PMHX 3.4cm L Lung Mass (known, refusing workup/surgical eval/biopsy), Hx Intracranial Tumor s/p L Frontal Craniotomy, Seizure Disorder, COPD/Emphysema, Tobacco Abuse/Active Smoker was brought to [facility] by his wife for evaluation of Hiccups x1 week, AMS, and Fever and was subsequently BIBEMS to [facility] ER for further evaluation. Hypoxia noted by EMS with O2sat 89% RA at rest improving to 99% on 6L NC. Fever Tmax 103.4F noted in Triage. +Associated Chills. +Nonproductive Cough/No sputum. Also c/o acute on chronic LE pain (hx LE trauma as a child). +Fatigue, +Gen Weakness. No CP. No N/V/D. No abd pain. No other issues. Pt noted to have significant hyponatremia on labs with Na 122. No leukocytosis but lymphocytopenia on differential. CXR Hyperinflated with RLL infiltrates. CTA Chest with emphysema, 3.4cm Left Lung Apical Mass, multiple other scar-like densities, unchanged from prior imaging. Negative for PE. +new GGO RLL and +R Hilar LAD possible Atypical/COVID PNA. LE Dopplers negative for DVT (LE pain is chronic per patient). CT head for AMS shows post-surgical changes from L Craniotomy otherwise no acute findings. Pt seen/examined. Poor Historian at baseline, more difficult with AMS. Outpatient HIE Notes reviewed and EMR/Labs/Imaging reviewed.      Meds in ED: Tylenol 650mg PO x1, Metoclopramide 10mg IVP x1, Albuterol MDI x1, Atrovent MDI x1, Desmopressin 2mcg SUBQ, Solumedrol 125mg IVP x1

## 2020-08-02 NOTE — CONSULT NOTE ADULT - SUBJECTIVE AND OBJECTIVE BOX
HPI:  68 y/o M with PMHX 3.4cm L Lung Mass (known, refusing workup/surgical eval/biopsy), Hx Intracranial Tumor s/p L Frontal Craniotomy, Seizure Disorder, COPD/Emphysema, Tobacco Abuse/Active Smoker was brought to [facility] by his wife for evaluation of Hiccups x1 week, AMS, and Fever and was subsequently BIBEMS to [facility] ER for further evaluation. Hypoxia noted by EMS with O2sat 89% RA at rest improving to 99% on 6L NC. Fever Tmax 103.4F noted in Triage. +Associated Chills. +Nonproductive Cough/No sputum. Also c/o acute on chronic LE pain (hx LE trauma as a child). +Fatigue, +Gen Weakness. No CP. No N/V/D. No abd pain. No other issues. Pt noted to have significant hyponatremia on labs with Na 122. No leukocytosis but lymphocytopenia on differential. CXR Hyperinflated with RLL infiltrates. CTA Chest with emphysema, 3.4cm Left Lung Apical Mass, multiple other scar-like densities, unchanged from prior imaging. Negative for PE. +new GGO RLL and +R Hilar LAD possible Atypical/COVID PNA. LE Dopplers negative for DVT (LE pain is chronic per patient). CT head for AMS shows post-surgical changes from L Craniotomy otherwise no acute findings. Pt seen/examined. Poor Historian at baseline, more difficult with AMS. Outpatient HIE Notes reviewed and EMR/Labs/Imaging reviewed.      Meds in ED: Tylenol 650mg PO x1, Metoclopramide 10mg IVP x1, Albuterol MDI x1, Atrovent MDI x1, Desmopressin 2mcg SUBQ, Solumedrol 125mg IVP x1 (02 Aug 2020 01:30)   Hx renal stones x1 not aware of type, no other renal  problems; elevated creatinine on admission.    PAST MEDICAL & SURGICAL HISTORY:  Tobacco abuse: Active daily Smoker 1ppd down from 4ppd  COPD with emphysema  Lung mass: Pt refusing surgical eval/biopsy/workup for mass  Anemia of folate deficiency  Brain tumor: s/p resection  Seizure: Hx Dilantin Toxicity  H/O brain surgery: s/p L Frontal Craniotomy      FAMILY HISTORY:  No pertinent family history in first degree relatives  [place]    Social History:Non smoker    MEDICATIONS  (STANDING):  enoxaparin Injectable 40 milliGRAM(s) SubCutaneous every 12 hours  folic acid 1 milliGRAM(s) Oral daily  PHENobarbital 64.8 milliGRAM(s) Oral every 12 hours  phenytoin   Capsule 200 milliGRAM(s) Oral every 12 hours  sodium chloride 0.9%. 1000 milliLiter(s) (75 mL/Hr) IV Continuous <Continuous>  tiotropium 18 MICROgram(s) Capsule 1 Capsule(s) Inhalation daily    MEDICATIONS  (PRN):  acetaminophen   Tablet .. 650 milliGRAM(s) Oral every 6 hours PRN Temp greater or equal to 38C (100.4F), Mild Pain (1 - 3)  ALBUTerol    90 MICROgram(s) HFA Inhaler 2 Puff(s) Inhalation every 6 hours PRN Shortness of Breath and/or Wheezing  nicotine - 21 mG/24Hr(s) Patch 1 patch Transdermal daily PRN Nicotien Withdrawal   Meds reviewed      Vital Signs Last 24 Hrs  T(C): 37.6 (02 Aug 2020 07:46), Max: 39.6 (01 Aug 2020 18:01)  T(F): 99.6 (02 Aug 2020 07:46), Max: 103.2 (01 Aug 2020 18:01)  HR: 70 (02 Aug 2020 07:46) (70 - 85)  BP: 107/65 (02 Aug 2020 07:46) (107/65 - 143/95)  BP(mean): --  RR: 16 (02 Aug 2020 07:46) (15 - 18)  SpO2: 99% (02 Aug 2020 07:46) (96% - 99%)  Daily Height in cm: 182.88 (01 Aug 2020 18:01)    Daily     PHYSICAL EXAM:    GENERAL: appears chronically ill  HEAD:  Atraumatic, Normocephalic  EYES: EOMI  NECK: Supple, neck  veins full  NERVOUS SYSTEM:  Alert & Oriented X3  CHEST/LUNG: Clear to percussion bilaterally  HEART: Regular rate and rhythm  ABDOMEN: Soft, Nontender, Nondistended; Bowel sounds present  EXTREMITIES:   edema      LABS:                        11.0   6.78  )-----------( 210      ( 01 Aug 2020 18:49 )             31.9     08-02    124<L>  |  89<L>  |  8.0  ----------------------------<  110<H>  4.3   |  22.0  |  0.69    Ca    8.3<L>      02 Aug 2020 02:24  Phos  2.8     08-02  Mg     1.9     08-02    TPro  6.8  /  Alb  3.6  /  TBili  0.2<L>  /  DBili  x   /  AST  34  /  ALT  24  /  AlkPhos  103  08-01    PT/INR - ( 01 Aug 2020 18:49 )   PT: 14.3 sec;   INR: 1.25 ratio         PTT - ( 01 Aug 2020 18:49 )  PTT:25.9 sec    Magnesium, Serum: 1.9 mg/dL (08-02 @ 02:24)  Phosphorus Level, Serum: 2.8 mg/dL (08-02 @ 02:24)          RADIOLOGY & ADDITIONAL TESTS: HPI:  68 y/o M with PMHX 3.4cm L Lung Mass (known, refusing workup/surgical eval/biopsy), Hx Intracranial Tumor s/p L Frontal Craniotomy, Seizure Disorder, COPD/Emphysema, Tobacco Abuse/Active Smoker was brought to [facility] by his wife for evaluation of Hiccups x1 week, AMS, and Fever and was subsequently BIBEMS to [facility] ER for further evaluation. Hypoxia noted by EMS with O2sat 89% RA at rest improving to 99% on 6L NC. Fever Tmax 103.4F noted in Triage. +Associated Chills. +Nonproductive Cough/No sputum. Also c/o acute on chronic LE pain (hx LE trauma as a child). +Fatigue, +Gen Weakness. No CP. No N/V/D. No abd pain. No other issues. Pt noted to have significant hyponatremia on labs with Na 122. No leukocytosis but lymphocytopenia on differential. CXR Hyperinflated with RLL infiltrates. CTA Chest with emphysema, 3.4cm Left Lung Apical Mass, multiple other scar-like densities, unchanged from prior imaging. Negative for PE. +new GGO RLL and +R Hilar LAD possible Atypical/COVID PNA. LE Dopplers negative for DVT (LE pain is chronic per patient). CT head for AMS shows post-surgical changes from L Craniotomy otherwise no acute findings. Pt seen/examined. Poor Historian at baseline, more difficult with AMS. Outpatient HIE Notes reviewed and EMR/Labs/Imaging reviewed.      Meds in ED: Tylenol 650mg PO x1, Metoclopramide 10mg IVP x1, Albuterol MDI x1, Atrovent MDI x1, Desmopressin 2mcg SUBQ, Solumedrol 125mg IVP x1 (02 Aug 2020 01:30)   Hx renal stones x1 not aware of type, no other renal  problems; elevated creatinine on admission.    PAST MEDICAL & SURGICAL HISTORY:  Tobacco abuse: Active daily Smoker 1ppd down from 4ppd  COPD with emphysema  Lung mass: Pt refusing surgical eval/biopsy/workup for mass  Anemia of folate deficiency  Brain tumor: s/p resection  Seizure: Hx Dilantin Toxicity  H/O brain surgery: s/p L Frontal Craniotomy      FAMILY HISTORY:  No pertinent family history in first degree relatives  [place]    Social History:Non smoker    MEDICATIONS  (STANDING):  enoxaparin Injectable 40 milliGRAM(s) SubCutaneous every 12 hours  folic acid 1 milliGRAM(s) Oral daily  PHENobarbital 64.8 milliGRAM(s) Oral every 12 hours  phenytoin   Capsule 200 milliGRAM(s) Oral every 12 hours  sodium chloride 0.9%. 1000 milliLiter(s) (75 mL/Hr) IV Continuous <Continuous>  tiotropium 18 MICROgram(s) Capsule 1 Capsule(s) Inhalation daily    MEDICATIONS  (PRN):  acetaminophen   Tablet .. 650 milliGRAM(s) Oral every 6 hours PRN Temp greater or equal to 38C (100.4F), Mild Pain (1 - 3)  ALBUTerol    90 MICROgram(s) HFA Inhaler 2 Puff(s) Inhalation every 6 hours PRN Shortness of Breath and/or Wheezing  nicotine - 21 mG/24Hr(s) Patch 1 patch Transdermal daily PRN Nicotien Withdrawal   Meds reviewed      Vital Signs Last 24 Hrs  T(C): 37.6 (02 Aug 2020 07:46), Max: 39.6 (01 Aug 2020 18:01)  T(F): 99.6 (02 Aug 2020 07:46), Max: 103.2 (01 Aug 2020 18:01)  HR: 70 (02 Aug 2020 07:46) (70 - 85)  BP: 107/65 (02 Aug 2020 07:46) (107/65 - 143/95)  BP(mean): --  RR: 16 (02 Aug 2020 07:46) (15 - 18)  SpO2: 99% (02 Aug 2020 07:46) (96% - 99%)  Daily Height in cm: 182.88 (01 Aug 2020 18:01)    Daily     PHYSICAL EXAM:    GENERAL: appears chronically ill  HEAD:  Atraumatic, Normocephalic  EYES: EOMI  NECK: Supple, neck  veins full  NERVOUS SYSTEM:  Alert & Oriented X3  CHEST/LUNG: Clear to percussion bilaterally  HEART: Regular rate and rhythm  ABDOMEN: Soft, Nontender, Nondistended; Bowel sounds present  EXTREMITIES:   no edema      LABS:                        11.0   6.78  )-----------( 210      ( 01 Aug 2020 18:49 )             31.9     08-02    124<L>  |  89<L>  |  8.0  ----------------------------<  110<H>  4.3   |  22.0  |  0.69    Ca    8.3<L>      02 Aug 2020 02:24  Phos  2.8     08-02  Mg     1.9     08-02    TPro  6.8  /  Alb  3.6  /  TBili  0.2<L>  /  DBili  x   /  AST  34  /  ALT  24  /  AlkPhos  103  08-01    PT/INR - ( 01 Aug 2020 18:49 )   PT: 14.3 sec;   INR: 1.25 ratio         PTT - ( 01 Aug 2020 18:49 )  PTT:25.9 sec    Magnesium, Serum: 1.9 mg/dL (08-02 @ 02:24)  Phosphorus Level, Serum: 2.8 mg/dL (08-02 @ 02:24)          RADIOLOGY & ADDITIONAL TESTS:

## 2020-08-02 NOTE — H&P ADULT - ASSESSMENT
A/P:  Acute Hypoxemic Respiratory Failure   -2/2 COPD/Emphysema v Lung Mass v COVID19 PNA  -EKG Sinus Rhythm 81 with +PACs, no acute ischemic changes  -CXR Hyperinflated, Flat Diaphragms, +RLL Interstitial Infiltrates  -CTA Chest negative for PE. +RLL GGO likely COVID PNA. Unchanged 3.4cm Lung Mass.   -Admit to Medicine/Telemetry  -repeat labs  -trend elevated inflammatory markers   -O2 via NC titrate FIO2 for O2sat >88% and <96%  -Albuterol and Atrovent MDI q4 PRN  -s/p Solumedrol 125mg IV x1 in ER  -F/u COVID19 PCR and AB pending  -Start Dexamethasone 6mg IV q24 x10-14 for mortality benefit if COVID PCR Positive  -VTE PPX with Lovenox  -Isolation Precautions    Hyponatremia  -SIADH 2/2 Lung CA v COVID19 Hyponatremia v Medication-induced   -Na 122 on admit with repeat Na 122  -S/P 1L IVFB NSS in ER   -s/p Desmopressin in ER  -Currently on IVF NSS @150cc/hr - lower rate to 75cc/hr.   -Trend BMP/Mg/Phos Q4  -Check urine studies  -monitor for rapid overcorrection of NA  -Goal Na Change 4-6meq/24 hours max to prevent CPL  -Nephrology Consult  -if repeat Na worse then dc IVF and start Fluid Restriction    AMS   -infectious 2/2 COVID19 vs TME 2/2 Hyponatremia. Plan as above.     L Lung Mass  -Per Outpatient PCP Kayleen Cedeno - patient refusing surgical evaluation, biopsy, further management for known lung mass. Was considering repeat CT imaging. Remains an active smoker 1ppd (previously up to 4ppd).   -CTA on admit with findings similar to prior CT except for GGO  -Conservative Management per patient wishes. Outpatient F/u.    COPD/Emphysema  -Plan as above.     Brain Tumor s/p L Frontal Craniotomy  -CTH with postsurgical changes and midline metallic findings. No acute findings.     Seizure Disorder, Hx Dilantin Toxicity  -Phenobarbital 64.8mg PO q12   -Phenytoin 200mg ER PO q12  -Seizure Precautions  -Stat Dilantin and Phenobarbital levels    Folate Deficiency Anemia  -Folic Acid 1mg PO q24    Tobacco Abuse/Current Smoker  - cessation. Active daily Smoker 1ppd. Nicotine Patch PRN.

## 2020-08-02 NOTE — H&P ADULT - NSICDXPASTMEDICALHX_GEN_ALL_CORE_FT
PAST MEDICAL HISTORY:  Anemia of folate deficiency     Brain tumor s/p resection    COPD with emphysema     Lung mass Pt refusing surgical eval/biopsy/workup for mass    Seizure Hx Dilantin Toxicity    Tobacco abuse Active daily Smoker 1ppd down from 4ppd

## 2020-08-02 NOTE — H&P ADULT - NSHPPHYSICALEXAM_GEN_ALL_CORE
Vital Signs Last 24 Hrs  T(C): 37.2 (01 Aug 2020 21:55), Max: 39.6 (01 Aug 2020 18:01)  T(F): 99 (01 Aug 2020 21:55), Max: 103.2 (01 Aug 2020 18:01)  HR: 85 (01 Aug 2020 21:55) (81 - 85)  BP: 122/68 (01 Aug 2020 21:55) (122/68 - 143/95)  BP(mean): --  RR: 16 (01 Aug 2020 21:55) (15 - 18)  SpO2: 96% (01 Aug 2020 21:55) (96% - 99%)    Constitutional: Well-appearing, NAD, VSS  Head: NC/AT, +NC in place  Eyes: PERRL, EOMI  ENT: Normal Pharynx, MMM  Neck: Supple, Non-tender  Chest: Non-tender, no rashes  Cardio: RRR, s1/s2, no appreciable murmurs/rubs/gallops  Resp: BS CTA bilaterally, no wheezing/rhonchi/rales  Abd: Soft, Non-tender, Non-distended, no rebound/guarding/rigidity  MSK: moving all extremities, no motor weakness, full ROM x4  Ext: palpable distal pulses, good capillary refill  Psych: Confused  Neuro: Awake, Alert, mildly confused, moving all extremities, unstable gait   Skin: Warm/Dry. No rashes.

## 2020-08-02 NOTE — H&P ADULT - NSHPSOCIALHISTORY_GEN_ALL_CORE
+Active Daily Smoker 1ppd (previously 4ppd). EMR has prior ETOH abuse hx documented. No drug abuse.   Lives at home with wife.

## 2020-08-02 NOTE — CONSULT NOTE ADULT - ASSESSMENT
HypoNatremia   Etiology?  will check Urine osm, Urine Na, serum uric acid level  Has h/o L Lung Mass has refused workup treatment,   Intracranial Tumor s/p L Frontal Craniotomy, Seizure Disorder, COPD  At admit hypoxic, febrile to 103F, w cough, fibringen > 1000  Covid checked -not detected? would repeat HypoNatremia   Etiology?  will check Urine osm, Urine Na, serum uric acid level  Has h/o L Lung Mass has refused workup treatment,   Intracranial Tumor s/p L Frontal Craniotomy, Seizure Disorder, COPD  At admit hypoxic, febrile to 103F, w cough, fibringen > 1000  Covid checked -not detected? would repeat  Cont gentle IVF 12 hrs then stop  Await labs repeat BMP    Will follow

## 2020-08-02 NOTE — ED ADULT NURSE REASSESSMENT NOTE - NS ED NURSE REASSESS COMMENT FT1
pt sleeping comfortably at this time, in no apparent distress. remains on cardiac monitor and , RR even and unlabored. pending admit bed, all safety measures maintained

## 2020-08-03 LAB
ANION GAP SERPL CALC-SCNC: 14 MMOL/L — SIGNIFICANT CHANGE UP (ref 5–17)
BUN SERPL-MCNC: 8 MG/DL — SIGNIFICANT CHANGE UP (ref 8–20)
CALCIUM SERPL-MCNC: 8.6 MG/DL — SIGNIFICANT CHANGE UP (ref 8.6–10.2)
CHLORIDE SERPL-SCNC: 96 MMOL/L — LOW (ref 98–107)
CO2 SERPL-SCNC: 22 MMOL/L — SIGNIFICANT CHANGE UP (ref 22–29)
CREAT SERPL-MCNC: 0.67 MG/DL — SIGNIFICANT CHANGE UP (ref 0.5–1.3)
GLUCOSE SERPL-MCNC: 95 MG/DL — SIGNIFICANT CHANGE UP (ref 70–99)
HCT VFR BLD CALC: 31.1 % — LOW (ref 39–50)
HGB BLD-MCNC: 10.4 G/DL — LOW (ref 13–17)
MCHC RBC-ENTMCNC: 33.4 GM/DL — SIGNIFICANT CHANGE UP (ref 32–36)
MCHC RBC-ENTMCNC: 35.9 PG — HIGH (ref 27–34)
MCV RBC AUTO: 107.2 FL — HIGH (ref 80–100)
PLATELET # BLD AUTO: 258 K/UL — SIGNIFICANT CHANGE UP (ref 150–400)
POTASSIUM SERPL-MCNC: 3.5 MMOL/L — SIGNIFICANT CHANGE UP (ref 3.5–5.3)
POTASSIUM SERPL-SCNC: 3.5 MMOL/L — SIGNIFICANT CHANGE UP (ref 3.5–5.3)
RBC # BLD: 2.9 M/UL — LOW (ref 4.2–5.8)
RBC # FLD: 13.3 % — SIGNIFICANT CHANGE UP (ref 10.3–14.5)
SODIUM SERPL-SCNC: 132 MMOL/L — LOW (ref 135–145)
WBC # BLD: 6.63 K/UL — SIGNIFICANT CHANGE UP (ref 3.8–10.5)
WBC # FLD AUTO: 6.63 K/UL — SIGNIFICANT CHANGE UP (ref 3.8–10.5)

## 2020-08-03 PROCEDURE — 99223 1ST HOSP IP/OBS HIGH 75: CPT

## 2020-08-03 PROCEDURE — 99233 SBSQ HOSP IP/OBS HIGH 50: CPT

## 2020-08-03 PROCEDURE — 93010 ELECTROCARDIOGRAM REPORT: CPT

## 2020-08-03 RX ORDER — METOPROLOL TARTRATE 50 MG
25 TABLET ORAL EVERY 8 HOURS
Refills: 0 | Status: DISCONTINUED | OUTPATIENT
Start: 2020-08-03 | End: 2020-08-03

## 2020-08-03 RX ORDER — METOPROLOL TARTRATE 50 MG
5 TABLET ORAL EVERY 6 HOURS
Refills: 0 | Status: DISCONTINUED | OUTPATIENT
Start: 2020-08-03 | End: 2020-08-03

## 2020-08-03 RX ORDER — ENOXAPARIN SODIUM 100 MG/ML
70 INJECTION SUBCUTANEOUS EVERY 12 HOURS
Refills: 0 | Status: DISCONTINUED | OUTPATIENT
Start: 2020-08-03 | End: 2020-08-04

## 2020-08-03 RX ORDER — DILTIAZEM HCL 120 MG
30 CAPSULE, EXT RELEASE 24 HR ORAL EVERY 6 HOURS
Refills: 0 | Status: DISCONTINUED | OUTPATIENT
Start: 2020-08-03 | End: 2020-08-04

## 2020-08-03 RX ORDER — DILTIAZEM HCL 120 MG
10 CAPSULE, EXT RELEASE 24 HR ORAL EVERY 4 HOURS
Refills: 0 | Status: DISCONTINUED | OUTPATIENT
Start: 2020-08-03 | End: 2020-08-05

## 2020-08-03 RX ADMIN — ENOXAPARIN SODIUM 40 MILLIGRAM(S): 100 INJECTION SUBCUTANEOUS at 05:41

## 2020-08-03 RX ADMIN — Medication 200 MILLIGRAM(S): at 05:41

## 2020-08-03 RX ADMIN — Medication 10 MILLIGRAM(S): at 15:07

## 2020-08-03 RX ADMIN — Medication 25 MILLIGRAM(S): at 05:41

## 2020-08-03 RX ADMIN — Medication 200 MILLIGRAM(S): at 17:57

## 2020-08-03 RX ADMIN — Medication 30 MILLIGRAM(S): at 17:57

## 2020-08-03 RX ADMIN — Medication 25 MILLIGRAM(S): at 23:04

## 2020-08-03 RX ADMIN — Medication 64.8 MILLIGRAM(S): at 17:57

## 2020-08-03 RX ADMIN — Medication 30 MILLIGRAM(S): at 14:06

## 2020-08-03 RX ADMIN — Medication 30 MILLIGRAM(S): at 23:04

## 2020-08-03 RX ADMIN — Medication 64.8 MILLIGRAM(S): at 05:41

## 2020-08-03 RX ADMIN — Medication 25 MILLIGRAM(S): at 13:27

## 2020-08-03 RX ADMIN — ENOXAPARIN SODIUM 70 MILLIGRAM(S): 100 INJECTION SUBCUTANEOUS at 18:03

## 2020-08-03 RX ADMIN — Medication 1 MILLIGRAM(S): at 13:27

## 2020-08-03 NOTE — CONSULT NOTE ADULT - ASSESSMENT
A/P:  70 y/o M active smoker with a PMHx of MI, Lung Mass (known, refusing workup/surgical eval/biopsy), hx intracranial tumor s/p L Frontal Craniotomy, seizure disorder, and COPD/Emphysema who presented to the ED with complaints of hiccups, fever, and confusion. Patient states that he had been experiencing hiccups for a week, but also began to feel fevers and chills. Patient's wife brought him to [facility] where he was noted to be hypoxic, and was sent to the ED to be evaluated. Patient also noted a non-productive cough, but denies any shortness of breath. Patient's CTA chest showed RLL PNA and emphysema, no PE. Patient was seen by Dr. Cabral 3 years ago, and was advised to have an echo and nuclear stress test, but he never followed up. Patient states that he is feeling ok at this time, and denies any chest pain, palpitations, shortness of breath, leg swelling, abdominal pain, N/V/D, headache, or dizziness.   Troponin negative x 1    Narrow Complex Tachycardia  - -200s atrial tachycardia.  - Unclear if atrial fibrillation vs. flutter.   - D/C metoprolol.   - Diltiazem 10mg IV q4 PRN HR>120  - Started Diltiazem 30mg PO q6  - [id number] 2 (Hx CAD, Age)  - Would anticoagulate at this time with lovenox,  - Obtain TTE  - Continue telemetry monitoring.   - If HR sustained in 200s, would try adenosine.     CAD  - Hx of MI?  - Was supposed to have echo and stress test as an outpatient, but never followed up.   - Would start on ASA 81mg PO qday  - Check fasting lipid panel in AM.     Pneumonia  - Covid negative x 2  - Management per primary team.     Preliminary evaluation, please await official recommendations by Dr. Esteves A/P:  70 y/o M active smoker with a PMHx of MI, Lung Mass (known, refusing workup/surgical eval/biopsy), hx intracranial tumor s/p L Frontal Craniotomy, seizure disorder, and COPD/Emphysema who presented to the ED with complaints of hiccups, fever, and confusion. Patient states that he had been experiencing hiccups for a week, but also began to feel fevers and chills. Patient's wife brought him to [facility] where he was noted to be hypoxic, and was sent to the ED to be evaluated. Patient also noted a non-productive cough, but denies any shortness of breath. Patient's CTA chest showed RLL PNA and emphysema, no PE. Patient was seen by Dr. Cabral 3 years ago, and was advised to have an echo and nuclear stress test, but he never followed up. Patient states that he is feeling ok at this time, and denies any chest pain, palpitations, shortness of breath, leg swelling, abdominal pain, N/V/D, headache, or dizziness.   Troponin negative x 1    Narrow Complex Tachycardia  - -200s atrial tachycardia.  - Unclear if atrial fibrillation vs. flutter.   - D/C metoprolol.   - Diltiazem 10mg IV q4 PRN HR>120  - Started Diltiazem 30mg PO q6  - [id number] 2 (Hx CAD, Age)  - Would anticoagulate at this time with lovenox,  - Obtain TTE  - Continue telemetry monitoring.   - If HR sustained in 200s, would try adenosine.     CAD  - Hx of MI?  - Was supposed to have echo and stress test as an outpatient, but never followed up.   - Would start on ASA 81mg PO qday  - Check fasting lipid panel in AM.     Pneumonia  - Covid negative x 2  - Management per primary team.

## 2020-08-03 NOTE — PROGRESS NOTE ADULT - SUBJECTIVE AND OBJECTIVE BOX
NEPHROLOGY INTERVAL HPI/OVERNIGHT EVENTS:    Examined  no distress    MEDICATIONS  (STANDING):  chlorproMAZINE    Tablet 25 milliGRAM(s) Oral three times a day  diltiazem    Tablet 30 milliGRAM(s) Oral every 6 hours  enoxaparin Injectable 70 milliGRAM(s) SubCutaneous every 12 hours  folic acid 1 milliGRAM(s) Oral daily  PHENobarbital 64.8 milliGRAM(s) Oral every 12 hours  phenytoin   Capsule 200 milliGRAM(s) Oral every 12 hours  sodium chloride 0.9%. 1000 milliLiter(s) (75 mL/Hr) IV Continuous <Continuous>  tiotropium 18 MICROgram(s) Capsule 1 Capsule(s) Inhalation daily    MEDICATIONS  (PRN):  acetaminophen   Tablet .. 650 milliGRAM(s) Oral every 6 hours PRN Temp greater or equal to 38C (100.4F), Mild Pain (1 - 3)  ALBUTerol    90 MICROgram(s) HFA Inhaler 2 Puff(s) Inhalation every 6 hours PRN Shortness of Breath and/or Wheezing  diltiazem Injectable 10 milliGRAM(s) IV Push every 4 hours PRN HR sustatined >120  nicotine - 21 mG/24Hr(s) Patch 1 patch Transdermal daily PRN Nicotien Withdrawal      Allergies    No Known Allergies    Intolerances    paper tray only (Unknown)      Vital Signs Last 24 Hrs  T(C): 36.8 (03 Aug 2020 08:39), Max: 39.2 (02 Aug 2020 16:17)  T(F): 98.2 (03 Aug 2020 08:39), Max: 102.5 (02 Aug 2020 16:17)  HR: 148 (03 Aug 2020 08:39) (73 - 148)  BP: 132/85 (03 Aug 2020 08:39) (115/73 - 132/85)  BP(mean): --  RR: 20 (03 Aug 2020 08:39) (19 - 20)  SpO2: 96% (03 Aug 2020 08:39) (94% - 100%)  Daily     Daily     PHYSICAL EXAM:  GENERAL: appears chronically ill  HEAD:  Atraumatic, Normocephalic  EYES: EOMI  NECK: Supple, neck  veins full  NERVOUS SYSTEM:  Alert & Oriented X3  CHEST/LUNG: Clear to percussion bilaterally  HEART: Regular rate and rhythm  ABDOMEN: Soft, Nontender, Nondistended; Bowel sounds present  EXTREMITIES:   no edema    LABS:                        10.4   6.63  )-----------( 258      ( 03 Aug 2020 08:10 )             31.1     08-03    132<L>  |  96<L>  |  8.0  ----------------------------<  95  3.5   |  22.0  |  0.67    Ca    8.6      03 Aug 2020 07:56  Phos  2.5     08-02  Mg     1.9     08-02    TPro  6.8  /  Alb  3.6  /  TBili  0.2<L>  /  DBili  x   /  AST  34  /  ALT  24  /  AlkPhos  103  08-01    PT/INR - ( 01 Aug 2020 18:49 )   PT: 14.3 sec;   INR: 1.25 ratio         PTT - ( 01 Aug 2020 18:49 )  PTT:25.9 sec  Urinalysis Basic - ( 02 Aug 2020 08:52 )    Color: Yellow / Appearance: Clear / S.015 / pH: x  Gluc: x / Ketone: Small  / Bili: Negative / Urobili: Negative mg/dL   Blood: x / Protein: 30 mg/dL / Nitrite: Negative   Leuk Esterase: Negative / RBC: 3-5 /HPF / WBC 0-2   Sq Epi: x / Non Sq Epi: Occasional / Bacteria: Few              RADIOLOGY & ADDITIONAL TESTS:  mpom[place]

## 2020-08-03 NOTE — PROGRESS NOTE ADULT - ASSESSMENT
68 y/o M with PMHX 3.4cm L Lung Mass (known, refusing workup/surgical eval/biopsy), Hx Intracranial Tumor s/p L Frontal Craniotomy, Seizure Disorder, COPD/Emphysema, Tobacco Abuse/Active Smoker was brought to [facility] by his wife for evaluation of Hiccups x1 week, AMS, and Fever and was subsequently BIBEMS to [facility] ER for further evaluation. Hypoxia noted by EMS with O2sat 89% RA at rest improving to 99% on 6L NC. Fever Tmax 103.4F noted in Triage. +Associated Chills. +Nonproductive Cough/No sputum. Also c/o acute on chronic LE pain (hx LE trauma as a child). +Fatigue, +Gen Weakness. No CP. No N/V/D. No abd pain. No other issues. Pt noted to have significant hyponatremia on labs with Na 122. No leukocytosis but lymphocytopenia on differential. CXR Hyperinflated with RLL infiltrates. CTA Chest with emphysema, 3.4cm Left Lung Apical Mass, multiple other scar-like densities, unchanged from prior imaging. Negative for PE. +new GGO RLL and +R Hilar LAD possible Atypical/COVID PNA. LE Dopplers negative for DVT (LE pain is chronic per patient). CT head for AMS shows post-surgical changes from L Craniotomy otherwise no acute findings. Poor Historian at baseline, more difficult with AMS. Outpatient HIE Notes reviewed and EMR/Labs/Imaging reviewed.      Atrial Fibrillation  Narrow Complex Tachycardia  - -200s atrial tachycardia.  - Unclear if atrial fibrillation vs. flutter.   - D/C metoprolol.   - Diltiazem 10mg IV q4 PRN HR>120  - Started Diltiazem 30mg PO q6  - [id number] 2 (Hx CAD, Age)  - Would anticoagulate at this time with lovenox,  - Obtain TTE  - Continue telemetry monitoring.   - If HR sustained in 200s, would try adenosine.       Acute Hypoxemic Respiratory Failure   -2/2 COPD/Emphysema v Lung Mass. COVID-19 negative x2. Resolved. SPO2 Off O2 .  -EKG Sinus Rhythm 81 with +PACs, no acute ischemic changes in ER  -CXR Hyperinflated, Flat Diaphragms, +RLL Interstitial Infiltrates  -CTA Chest negative for PE. +RLL GGO likely COVID PNA. Unchanged 3.4cm Lung Mass.   -Cont Albuterol and Spiriva  -s/p Solumedrol 125mg IV x1 in ER  - AB pending  -Off Isolation Precautions; COVID19 neg x2    Hyponatremia  Nephrology consult eval and recs appreciated a  -SIADH 2/2 Lung CA v COVID19 Hyponatremia v Medication-induced   -Na 132 improved from 122 on admission  -C/w  NS 0.9% 1L @75ml/Hr.   -Trend BMP  -Check urine studies  -monitor for rapid overcorrection of NA    AMS   -infectious 2/2 COVID19 vs TME 2/2 Hyponatremia. Plan as above.     L Lung Mass  -Per Outpatient PCP Kayleen Cedeno - patient refusing surgical evaluation, biopsy, further management for known lung mass. Was considering repeat CT imaging. Remains an active smoker 1ppd (previously up to 4ppd).   -CTA on admit with findings similar to prior CT except for GGO  -Conservative Management per patient wishes. Outpatient F/u.    COPD/Emphysema  -Plan as above.     Brain Tumor s/p L Frontal Craniotomy  -CTH with postsurgical changes and midline metallic findings. No acute findings.     Seizure Disorder, Hx Dilantin Toxicity  -Phenobarbital 64.8mg PO q12   -Phenytoin 200mg ER PO q12  -Seizure Precautions  -Stat Dilantin and Phenobarbital levels    Folate Deficiency Anemia  -Folic Acid 1mg PO q24    Tobacco Abuse/Current Smoker  - cessation. Active daily Smoker 1ppd. Nicotine Patch PRN. 68 y/o M with PMHX 3.4cm L Lung Mass (known, refusing workup/surgical eval/biopsy), Hx Intracranial Tumor s/p L Frontal Craniotomy, Seizure Disorder, COPD/Emphysema, Tobacco Abuse/Active Smoker was brought to [facility] by his wife for evaluation of Hiccups x1 week, AMS, and Fever and was subsequently BIBEMS to [facility] ER for further evaluation. Hypoxia noted by EMS with O2sat 89% RA at rest improving to 99% on 6L NC. Fever Tmax 103.4F noted in Triage. +Associated Chills. +Nonproductive Cough/No sputum. Also c/o acute on chronic LE pain (hx LE trauma as a child). +Fatigue, +Gen Weakness. No CP. No N/V/D. No abd pain. No other issues. Pt noted to have significant hyponatremia on labs with Na 122. No leukocytosis but lymphocytopenia on differential. CXR Hyperinflated with RLL infiltrates. CTA Chest with emphysema, 3.4cm Left Lung Apical Mass, multiple other scar-like densities, unchanged from prior imaging. Negative for PE. +new GGO RLL and +R Hilar LAD possible Atypical/COVID PNA. LE Dopplers negative for DVT (LE pain is chronic per patient). CT head for AMS shows post-surgical changes from L Craniotomy otherwise no acute findings. Poor Historian at baseline, more difficult with AMS. Outpatient HIE Notes reviewed and EMR/Labs/Imaging reviewed.      Atrial Fibrillation?  Cardiology consult inputs appreciated  Narrow Complex Tachycardia  - -200s atrial tachycardia.  - Unclear if atrial fibrillation vs. flutter.   - D/C metoprolol.   - Diltiazem 10mg IV q4 PRN HR>120  - Started Diltiazem 30mg PO q6  - [id number] 2 (Hx CAD, Age)  - To anticoagulate at this time with lovenox,  - Obtain TTE  - Continue telemetry monitoring.   - If HR sustained in 200s, would try adenosine.     Acute Hypoxemic Respiratory Failure   -2/2 COPD/Emphysema v Lung Mass. COVID-19 negative x2. Resolved. Doing well off Off O2 .  -EKG Sinus Rhythm 81 with +PACs, no acute ischemic changes in ER  -CXR Hyperinflated, Flat Diaphragms, +RLL Interstitial Infiltrates  -CTA Chest negative for PE. +RLL GGO likely COVID PNA. Unchanged 3.4cm Lung Mass.   -Cont Albuterol and Spiriva  -s/p Solumedrol 125mg IV x1 in ER  - AB pending  -Off Isolation Precautions; COVID19 neg x2    Hyponatremia  Nephrology consult eval and recs appreciated. Nephrology continues to follow pt.  -SIADH 2/2 Lung CA v COVID19 Hyponatremia v Medication-induced   -Na 132 improved from 122 on admission  -Trend BMP  -monitor for rapid overcorrection of NA    AMS   -infectious, PNA? TME 2/2 Hyponatremia. Resolved    L Lung Mass  -Per Outpatient PCP Kayleen Cedeno - patient refused surgical evaluation, biopsy, further management for known lung mass. Was considering repeat CT imaging. Remains an active smoker 1ppd (previously up to 4ppd).   -CTA on admit with findings similar to prior CT except for GGO  -Conservative Management per patient wishes. Outpatient F/u.    COPD/Emphysema  -C/w Albuterol and Spiriva.   -CXR neg    Brain Tumor s/p L Frontal Craniotomy  -CTH with postsurgical changes and midline metallic findings. No acute findings.     Seizure Disorder, Hx Dilantin Toxicity  -Phenobarbital 64.8mg PO q12   -Phenytoin 200mg ER PO q12  -Seizure Precautions  -monitor Dilantin and Phenobarbital levels    Folate Deficiency Anemia  -Folic Acid 1mg PO q24    Tobacco Abuse/Current Smoker  - cessation. Active daily Smoker 1ppd. Nicotine Patch PRN.     DVT PPx  -Lovenox    Care discussed with Attending, Dr Staley.

## 2020-08-03 NOTE — PROGRESS NOTE ADULT - SUBJECTIVE AND OBJECTIVE BOX
HPI:     INTERVAL/OVERNIGHT EVENTS: No overnight  issues/events reported by ethe RN. Pt seen and examined at the bedside. Pt denies chest pain, nausea, vomiting, headaches, shortness of breath, palpitations, abdominal pain; says he feels fine.    PAST MEDICAL & SURGICAL HISTORY:  Tobacco abuse: Active daily Smoker 1ppd down from 4ppd  COPD with emphysema  Lung mass: Pt refusing surgical eval/biopsy/workup for mass  Anemia of folate deficiency  Brain tumor: s/p resection  Seizure: Hx Dilantin Toxicity  H/O brain surgery: s/p L Frontal Craniotomy    Vital Signs Last 24 Hrs  T(C): 36.8 (03 Aug 2020 08:39), Max: 39.2 (02 Aug 2020 16:17)  T(F): 98.2 (03 Aug 2020 08:39), Max: 102.5 (02 Aug 2020 16:17)  HR: 148 (03 Aug 2020 08:39) (73 - 148)  BP: 132/85 (03 Aug 2020 08:39) (115/73 - 132/85)  BP(mean): --  RR: 20 (03 Aug 2020 08:39) (19 - 20)  SpO2: 96% (03 Aug 2020 08:39) (94% - 100%)    PHYSICAL EXAM:  GENERAL:  Alert and oriented x4. Unlabored breathing, no distress.  HEAD: normocephalic and atraumatic  ENMT: EOM are intact. Mucous membranes are moist. Oroph. clear, erythema/exudates ,   NECK: Supple. No lymphadenopathy, no JVD  LUNGS: Clear to auscultation BL with no wheezing, rales or rhonchi;  HEART: Irregular rate and rhythm ,+S1/+S2, no murmurs, rubs, gallops  ABDOMEN: Soft, nontender, and nondistended, bowel sounds in all 4 quadrants  EXTREMITIES: Without any cyanosis, clubbing, rash, lesions or edema.  SKIN: No new rashes or lesions.  MSK: strength equal BL  VASCULAR: Radial and Dorsal pedal pulses palpable BL  NEUROLOGIC: Grossly intact.  PSYCH: No new changes.     @ 07:01  -  - @ 07:00  --------------------------------------------------------  IN: 450 mL / OUT: 540 mL / NET: -90 mL                      10.4   6.63  )-----------( 258      ( 03 Aug 2020 08:10 )             31.1     08-03    132<L>  |  96<L>  |  8.0  ----------------------------<  95  3.5   |  22.0  |  0.67    Ca    8.6      03 Aug 2020 07:56  Phos  2.5     08-02  Mg     1.9     08-    TPro  6.8  /  Alb  3.6  /  TBili  0.2<L>  /  DBili  x   /  AST  34  /  ALT  24  /  AlkPhos  103  08-01         LIVER FUNCTIONS - ( 01 Aug 2020 21:50 )  Alb: 3.6 g/dL / Pro: 6.8 g/dL / ALK PHOS: 103 U/L / ALT: 24 U/L / AST: 34 U/L / GGT: x         Urinalysis Basic - ( 02 Aug 2020 08:52 )    Color: Yellow / Appearance: Clear / S.015 / pH: x  Gluc: x / Ketone: Small  / Bili: Negative / Urobili: Negative mg/dL   Blood: x / Protein: 30 mg/dL / Nitrite: Negative   Leuk Esterase: Negative / RBC: 3-5 /HPF / WBC 0-2   Sq Epi: x / Non Sq Epi: Occasional / Bacteria: Few     PT/INR - ( 01 Aug 2020 18:49 )   PT: 14.3 sec;   INR: 1.25 ratio    PTT - ( 01 Aug 2020 18:49 )  PTT:25.9 sec    MEDICATIONS  (STANDING):  chlorproMAZINE    Tablet 25 milliGRAM(s) Oral three times a day  diltiazem    Tablet 30 milliGRAM(s) Oral every 6 hours  enoxaparin Injectable 70 milliGRAM(s) SubCutaneous every 12 hours  folic acid 1 milliGRAM(s) Oral daily  PHENobarbital 64.8 milliGRAM(s) Oral every 12 hours  phenytoin   Capsule 200 milliGRAM(s) Oral every 12 hours  sodium chloride 0.9%. 1000 milliLiter(s) (75 mL/Hr) IV Continuous <Continuous>  tiotropium 18 MICROgram(s) Capsule 1 Capsule(s) Inhalation daily    MEDICATIONS  (PRN):  acetaminophen   Tablet .. 650 milliGRAM(s) Oral every 6 hours PRN Temp greater or equal to 38C (100.4F), Mild Pain (1 - 3)  ALBUTerol    90 MICROgram(s) HFA Inhaler 2 Puff(s) Inhalation every 6 hours PRN Shortness of Breath and/or Wheezing  diltiazem Injectable 10 milliGRAM(s) IV Push every 4 hours PRN HR sustatined >120  nicotine - 21 mG/24Hr(s) Patch 1 patch Transdermal daily PRN Nicotien Withdrawal      RADIOLOGY & ADDITIONAL STUDIES:    X-ray:  reviewed by me.   < from: Xray Chest 1 View-PORTABLE IMMEDIATE (20 @ 21:58) >   EXAM:  XR CHEST PORTABLE IMMED 1V                          PROCEDURE DATE:  2020      INTERPRETATION:  CLINICAL INFORMATION: Hypoxia. Shortness of breath.    TECHNIQUE: Frontal view of the chest  COMPARISON: 2016.    FINDINGS:  LUNGS/PLEURA: Patchy consolidation in the right lower lung. No pleural effusion or pneumothorax.  MEDIASTINUM: Cardiomediastinal silhouette is unremarkable.  OTHER: None.    IMPRESSION: Right lower lung pneumonia.    CT scan:   < from: CT Angio Chest w/ IV Cont (20 @ 21:42) >  FINDINGS:    LUNGS AND AIRWAYS: Patent central airways. Mild diffuse centrilobular emphysema. Calcified nodular mass abutting the pleural surface measuring up to 3.4 cm in the left lung apex and additional nodular calcified scarlike opacities along the left upper mediastinum, unchanged compared with prior exam. Mild biapical pleural parenchymal scarring. Scarlike nodular opacity in the central right middle lobe (series 6:256), unchanged. Right middle lobe triangular-shaped nodule measuring up to 4 mm (series 6:371), unchanged. Peripheral groundglass opacity in the right lower lobe which is new compared with prior exam. Mild bibasilar dependent and platelike atelectasis.  PLEURA: No pleural effusion.  MEDIASTINUM AND CORI: Mildly enlarged right hilar lymph notes measuring up to 2.2 x 1.2 cm (series 6:231).  VESSELS: Adequate pulmonary arterial opacification. No pulmonary embolism. Main pulmonary artery is not dilated. Thoracic aorta is normal in caliber. Atherosclerotic calcifications of the thoracic aorta, proximal great vessels, and coronary arteries.  HEART: Heart size is normal. No pericardial effusion.  CHESTWALL AND LOWER NECK: Within normal limits.  VISUALIZED UPPER ABDOMEN: Cholecystectomy clips. Mild thickening of the adrenal glands.  BONES: Mild degenerative changes of the spine.    IMPRESSION:  No pulmonary embolism.  Peripheral groundglass opacity in the right lower lobe which may be infectious or inflammatory including atypical/viral infection such as COVID 19. Mildly enlarged right hilar lymph nodes may be reactive.  Otherwise, no interval change in lung findings compared to prior exam from 10/1/2016.

## 2020-08-03 NOTE — CONSULT NOTE ADULT - SUBJECTIVE AND OBJECTIVE BOX
[facility]                                                               Office:  [address] [address]                                                              Telephone: [phone]. Fax:[phone]                                                                        CARDIOLOGY CONSULTATION NOTE                                                                                             Consult requested by:  Dr. Staley  Reason for Consultation: Atrial Fibrillation  History obtained by: Patient and medical record   obtained: No    Chief complaint:    Patient is a 69y old  Male who presents with a chief complaint of AMS, Acute Hypoxemic Respiratory Failure, Suspected COVID19 Viral PNA, Hyponatremia (02 Aug 2020 08:11)      HPI: 68 y/o M active smoker with a PMHx of MI, Lung Mass (known, refusing workup/surgical eval/biopsy), hx intracranial tumor s/p L Frontal Craniotomy, seizure disorder, and COPD/Emphysema who presented to the ED with complaints of hiccups, fever, and confusion. Patient states that he had been experiencing hiccups for a week, but also began to feel fevers and chills. Patient's wife brought him to [facility] where he was noted to be hypoxic, and was sent to the ED to be evaluated. Patient also noted a non-productive cough, but denies any shortness of breath. Patient's CTA chest showed RLL PNA and emphysema, no PE. Patient was seen by Dr. Cabral 3 years ago, and was advised to have an echo and nuclear stress test, but he never followed up. Patient states that he is feeling ok at this time, and denies any chest pain, palpitations, shortness of breath, leg swelling, abdominal pain, N/V/D, headache, or dizziness.     REVIEW OF SYMPTOMS:     CONSTITUTIONAL: AS PER HPI  ENMT:  No difficulty hearing, tinnitus, vertigo; No sinus or throat pain  NECK: No pain or stiffness  CARDIOVASCULAR: AS PER HPI  RESPIRATORY: AS PER HPI  : No dysuria, no hematuria   GI: No dark color stool, no melena, no diarrhea, no constipation, no abdominal pain   NEURO: No headache, no dizziness, no slurred speech   MUSCULOSKELETAL: No joint pain or swelling; No muscle, back, or extremity pain  PSYCH: No agitation, no anxiety.    ALL OTHER REVIEW OF SYSTEMS ARE NEGATIVE.      PREVIOUS DIAGNOSTIC TESTING  ECHO FINDINGS: Pending    ALLERGIES: Allergies    No Known Allergies    Intolerances    paper tray only (Unknown)      PAST MEDICAL HISTORY  Tobacco abuse  COPD with emphysema  Lung mass  Anemia of folate deficiency  Brain tumor  Seizure      PAST SURGICAL HISTORY  H/O brain surgery      FAMILY HISTORY:  No pertinent family history in first degree relatives      SOCIAL HISTORY: Lives with his wife.   CIGARETTES:   Active 1 PPD smoker (down from 4 PPD)  ALCOHOL: Former EtOH  DRUGS: Denies      CURRENT MEDICATIONS:  diltiazem    Tablet 30 milliGRAM(s) Oral every 6 hours  diltiazem Injectable 10 milliGRAM(s) IV Push every 4 hours PRN    tiotropium 18 MICROgram(s) Capsule   chlorproMAZINE    Tablet  PHENobarbital  phenytoin   Capsule  enoxaparin Injectable  folic acid  sodium chloride 0.9%.    HOME MEDICATIONS:  Home Medications:  folic acid 1 mg oral tablet: 1 tab(s) orally once a day (03 Oct 2016 14:16)      Vital Signs Last 24 Hrs  T(C): 36.8 (03 Aug 2020 08:39), Max: 39.2 (02 Aug 2020 16:17)  T(F): 98.2 (03 Aug 2020 08:39), Max: 102.5 (02 Aug 2020 16:17)  HR: 148 (03 Aug 2020 08:39) (73 - 148)  BP: 132/85 (03 Aug 2020 08:39) (115/73 - 132/85)  RR: 20 (03 Aug 2020 08:39) (19 - 20)  SpO2: 96% (03 Aug 2020 08:39) (94% - 100%)      PHYSICAL EXAM:  Constitutional: Comfortable . No acute distress.   HEENT: Atraumatic and normocephalic , neck is supple . no JVD. No carotid bruit. PEERL   CNS: A&Ox3. No focal deficits. EOMI. Cranial nerves II-IX are intact.   Lymph Nodes: Cervical : Not palpable.  Respiratory: Coarse breath sounds with rales bilaterally   Cardiovascular: Tachycardic RRR. No murmur/rubs or gallop.  Gastrointestinal: Soft non-tender and non distended . +Bowel sounds. negative Rose's sign.  Extremities: No edema.   Psychiatric: Calm . no agitation.  Skin: No skin rash/ulcers visualized to face, hands or feet.    Intake and output:    @ 07:01  -   @ 07:00  --------------------------------------------------------  IN: 450 mL / OUT: 540 mL / NET: -90 mL        LABS:                        10.4   6.63  )-----------( 258      ( 03 Aug 2020 08:10 )             31.1     08-03    132<L>  |  96<L>  |  8.0  ----------------------------<  95  3.5   |  22.0  |  0.67    Ca    8.6      03 Aug 2020 07:56  Phos  2.5     08-02  Mg     1.9     08-02    TPro  6.8  /  Alb  3.6  /  TBili  0.2<L>  /  DBili  x   /  AST  34  /  ALT  24  /  AlkPhos  103  08-01    CARDIAC MARKERS ( 01 Aug 2020 18:49 )  x     / <0.01 ng/mL / 310 U/L / x     / 2.1 ng/mL    ;p-BNP=  PT/INR - ( 01 Aug 2020 18:49 )   PT: 14.3 sec;   INR: 1.25 ratio         PTT - ( 01 Aug 2020 18:49 )  PTT:25.9 sec  Urinalysis Basic - ( 02 Aug 2020 08:52 )    Color: Yellow / Appearance: Clear / S.015 / pH: x  Gluc: x / Ketone: Small  / Bili: Negative / Urobili: Negative mg/dL   Blood: x / Protein: 30 mg/dL / Nitrite: Negative   Leuk Esterase: Negative / RBC: 3-5 /HPF / WBC 0-2   Sq Epi: x / Non Sq Epi: Occasional / Bacteria: Few        INTERPRETATION OF TELEMETRY: Reviewed by me. Narrow complex tachycardia with -200s  ECG: Reviewed by me. Atrial tachycardia,  NSST/T wave abnormalities    RADIOLOGY & ADDITIONAL STUDIES:    X-ray:  reviewed by me.   < from: Xray Chest 1 View-PORTABLE IMMEDIATE (20 @ 21:58) >   EXAM:  XR CHEST PORTABLE IMMED 1V                          PROCEDURE DATE:  2020          INTERPRETATION:  CLINICAL INFORMATION: Hypoxia. Shortness of breath.    TECHNIQUE: Frontal view of the chest  COMPARISON: 2016.    FINDINGS:    LUNGS/PLEURA: Patchy consolidation in the right lower lung. No pleural effusion or pneumothorax.  MEDIASTINUM: Cardiomediastinal silhouette is unremarkable.  OTHER: None.    IMPRESSION:    Right lower lung pneumonia.    < end of copied text >    CT scan:   < from: CT Angio Chest w/ IV Cont (20 @ 21:42) >  FINDINGS:    LUNGS AND AIRWAYS: Patent central airways. Mild diffuse centrilobular emphysema. Calcified nodular mass abutting the pleural surface measuring up to 3.4 cm in the left lung apex and additional nodular calcified scarlike opacities along the left upper mediastinum, unchanged compared with prior exam. Mild biapical pleural parenchymal scarring. Scarlike nodular opacity in the central right middle lobe (series 6:256), unchanged. Right middle lobe triangular-shaped nodule measuring up to 4 mm (series 6:371), unchanged. Peripheral groundglass opacity in the right lower lobe which is new compared with prior exam. Mild bibasilar dependent and platelike atelectasis.  PLEURA: No pleural effusion.  MEDIASTINUM AND CORI: Mildly enlarged right hilar lymph notes measuring up to 2.2 x 1.2 cm (series 6:231).  VESSELS: Adequate pulmonary arterial opacification. No pulmonary embolism. Main pulmonary artery is not dilated. Thoracic aorta is normal in caliber. Atherosclerotic calcifications of the thoracic aorta, proximal great vessels, and coronary arteries.  HEART: Heart size is normal. No pericardial effusion.  CHESTWALL AND LOWER NECK: Within normal limits.  VISUALIZED UPPER ABDOMEN: Cholecystectomy clips. Mild thickening of the adrenal glands.  BONES: Mild degenerative changes of the spine.    IMPRESSION:  No pulmonary embolism.    Peripheral groundglass opacity in the right lower lobe which may be infectious or inflammatory including atypical/viral infection such as COVID 19. Mildly enlarged right hilar lymph nodes may be reactive.    Otherwise, no interval change in lung findings compared to prior exam from 10/1/2016.    < end of copied text >

## 2020-08-03 NOTE — CONSULT NOTE ADULT - ATTENDING COMMENTS
Narrow Complex Tachycardia. - unclear. Plan for calcium channel blockers.   TTE to assess LV function.   Consider cardizem gtt if needed for rate control.

## 2020-08-03 NOTE — PROGRESS NOTE ADULT - ASSESSMENT
HypoNatremia - improved  Urine osm, Urine Na, serum uric acid level- noted c/w SIADH  Has h/o L Lung Mass has refused workup treatment in past  Intracranial Tumor s/p L Frontal Craniotomy, Seizure Disorder, COPD  At admit hypoxic, febrile to 103F, w cough, fibringen > 1000  Covid checked -not detected? repeat neg value noted  Cont gentle IVF 12 hrs then stop- now off IVF  AM labs    Will follow

## 2020-08-03 NOTE — PROGRESS NOTE ADULT - ATTENDING COMMENTS
Attending Attestation:  I personally saw and evaluated the patient and agree with the above assessment and plan  seen at bedside  continue with rate control  patient off oxygen at this time  still with mild fever  will start him on oral antibiotics to treat RLL pneumonia  echo pending; SS cardiology consulted

## 2020-08-04 LAB
ANION GAP SERPL CALC-SCNC: 13 MMOL/L — SIGNIFICANT CHANGE UP (ref 5–17)
BUN SERPL-MCNC: 6 MG/DL — LOW (ref 8–20)
CALCIUM SERPL-MCNC: 8.9 MG/DL — SIGNIFICANT CHANGE UP (ref 8.6–10.2)
CHLORIDE SERPL-SCNC: 97 MMOL/L — LOW (ref 98–107)
CO2 SERPL-SCNC: 25 MMOL/L — SIGNIFICANT CHANGE UP (ref 22–29)
CREAT SERPL-MCNC: 0.65 MG/DL — SIGNIFICANT CHANGE UP (ref 0.5–1.3)
GLUCOSE SERPL-MCNC: 109 MG/DL — HIGH (ref 70–99)
HCV AB S/CO SERPL IA: 1.23 S/CO — HIGH (ref 0–0.99)
HCV AB SERPL-IMP: ABNORMAL
POTASSIUM SERPL-MCNC: 3.4 MMOL/L — LOW (ref 3.5–5.3)
POTASSIUM SERPL-SCNC: 3.4 MMOL/L — LOW (ref 3.5–5.3)
SODIUM SERPL-SCNC: 135 MMOL/L — SIGNIFICANT CHANGE UP (ref 135–145)

## 2020-08-04 PROCEDURE — 93010 ELECTROCARDIOGRAM REPORT: CPT

## 2020-08-04 PROCEDURE — 99232 SBSQ HOSP IP/OBS MODERATE 35: CPT

## 2020-08-04 PROCEDURE — 99233 SBSQ HOSP IP/OBS HIGH 50: CPT

## 2020-08-04 RX ORDER — POTASSIUM CHLORIDE 20 MEQ
40 PACKET (EA) ORAL ONCE
Refills: 0 | Status: DISCONTINUED | OUTPATIENT
Start: 2020-08-04 | End: 2020-08-04

## 2020-08-04 RX ORDER — POTASSIUM CHLORIDE 20 MEQ
40 PACKET (EA) ORAL ONCE
Refills: 0 | Status: COMPLETED | OUTPATIENT
Start: 2020-08-04 | End: 2020-08-04

## 2020-08-04 RX ORDER — DILTIAZEM HCL 120 MG
120 CAPSULE, EXT RELEASE 24 HR ORAL DAILY
Refills: 0 | Status: DISCONTINUED | OUTPATIENT
Start: 2020-08-04 | End: 2020-08-05

## 2020-08-04 RX ADMIN — Medication 200 MILLIGRAM(S): at 05:22

## 2020-08-04 RX ADMIN — Medication 30 MILLIGRAM(S): at 11:02

## 2020-08-04 RX ADMIN — Medication 30 MILLIGRAM(S): at 05:22

## 2020-08-04 RX ADMIN — Medication 25 MILLIGRAM(S): at 21:06

## 2020-08-04 RX ADMIN — Medication 120 MILLIGRAM(S): at 21:06

## 2020-08-04 RX ADMIN — Medication 200 MILLIGRAM(S): at 17:17

## 2020-08-04 RX ADMIN — Medication 40 MILLIEQUIVALENT(S): at 13:29

## 2020-08-04 RX ADMIN — Medication 25 MILLIGRAM(S): at 05:22

## 2020-08-04 RX ADMIN — ENOXAPARIN SODIUM 70 MILLIGRAM(S): 100 INJECTION SUBCUTANEOUS at 17:17

## 2020-08-04 RX ADMIN — Medication 650 MILLIGRAM(S): at 06:42

## 2020-08-04 RX ADMIN — Medication 30 MILLIGRAM(S): at 17:16

## 2020-08-04 RX ADMIN — Medication 64.8 MILLIGRAM(S): at 05:23

## 2020-08-04 RX ADMIN — Medication 25 MILLIGRAM(S): at 14:06

## 2020-08-04 RX ADMIN — ENOXAPARIN SODIUM 70 MILLIGRAM(S): 100 INJECTION SUBCUTANEOUS at 05:23

## 2020-08-04 RX ADMIN — TIOTROPIUM BROMIDE 1 CAPSULE(S): 18 CAPSULE ORAL; RESPIRATORY (INHALATION) at 08:53

## 2020-08-04 RX ADMIN — Medication 650 MILLIGRAM(S): at 19:12

## 2020-08-04 RX ADMIN — Medication 1 MILLIGRAM(S): at 11:01

## 2020-08-04 RX ADMIN — Medication 64.8 MILLIGRAM(S): at 17:18

## 2020-08-04 RX ADMIN — Medication 650 MILLIGRAM(S): at 05:12

## 2020-08-04 NOTE — PROGRESS NOTE ADULT - SUBJECTIVE AND OBJECTIVE BOX
[place] [facility]                                                               Office: [address] [address]                                                              Telephone: [phone]. Fax:[phone]                                                                             PROGRESS NOTE  Reason for follow up: Narrow complex tachycardia  Overnight: No new events.   Update: Patient maintaining NSR with PVCs/PACs, no further episodes of NCT. Patient states he is feeling well, has no palpitations, chest pain, or dyspnea. Covid negative x 2.       Review of symptoms:   Cardiac:  No chest pain. No dyspnea. No palpitations.  Respiratory: No cough. No dyspnea  Gastrointestinal: No diarrhea. No abdominal pain. No bleeding.     Past medical history: No updates.   	  Vitals:  T(C): 37.1 (08-04-20 @ 08:06), Max: 38.2 (08-04-20 @ 05:15)  HR: 94 (08-04-20 @ 10:55) (77 - 178)  BP: 131/85 (08-04-20 @ 10:55) (112/67 - 132/76)  RR: 18 (08-04-20 @ 08:06) (17 - 18)  SpO2: 99% (08-04-20 @ 08:06) (96% - 99%)  Wt(kg): --  I&O's Summary    03 Aug 2020 07:01  -  04 Aug 2020 07:00  --------------------------------------------------------  IN: 240 mL / OUT: 1350 mL / NET: -1110 mL    04 Aug 2020 07:01  -  04 Aug 2020 11:58  --------------------------------------------------------  IN: 0 mL / OUT: 450 mL / NET: -450 mL      Weight (kg): 68 (08-01 @ 18:01)      PHYSICAL EXAM:  Appearance: Comfortable. No acute distress  HEENT:  Head and neck: Atraumatic. Normocephalic.  Normal oral mucosa, PERRL, Neck is supple. No JVD, No carotid bruit.   Neurologic: A&Ox 3, no focal deficits. EOMI, Cranial nerves are intact.  Lymphatic: No cervical lymphadenopathy  Cardiovascular: Normal S1 S2, No murmur, rubs/gallops. No JVD, No edema  Respiratory: Bibasilar rales and rhonchi  Gastrointestinal:  Soft, Non-tender, + BS  Lower Extremities: No edema  Psychiatry: Patient is calm. No agitation. Mood & affect appropriate  Skin: No rashes/ecchymoses/cyanosis/ulcers visualized on the face, hands or feet.      CURRENT MEDICATIONS:  diltiazem    Tablet 30 milliGRAM(s) Oral every 6 hours  diltiazem Injectable 10 milliGRAM(s) IV Push every 4 hours PRN    tiotropium 18 MICROgram(s) Capsule  levoFLOXacin  Tablet  chlorproMAZINE    Tablet  PHENobarbital  phenytoin   Capsule  enoxaparin Injectable  folic acid  sodium chloride 0.9%.      DIAGNOSTIC TESTING:  [ ] Echocardiogram: Pending   OTHER:   < from: CT Angio Chest w/ IV Cont (08.01.20 @ 21:42) >     EXAM:  CT ANGIO CHEST (W)AW IC                          PROCEDURE DATE:  08/01/2020          INTERPRETATION:  CLINICAL INFORMATION: Pain and hiccups with hypoxia.    COMPARISON: 10/1/2016.    PROCEDURE:  CT Angiography of the Chest.  63 ml of Omnipaque 350 was injected intravenously.  Sagittal and coronal reformats were performed as well as 3D (MIP) reconstructions.    FINDINGS:    LUNGS AND AIRWAYS: Patent central airways. Mild diffuse centrilobular emphysema. Calcified nodular mass abutting the pleural surface measuring up to 3.4 cm in the left lung apex and additional nodular calcified scarlike opacities along the left upper mediastinum, unchanged compared with prior exam. Mild biapical pleural parenchymal scarring. Scarlike nodular opacity in the central right middle lobe (series 6:256), unchanged. Right middle lobe triangular-shaped nodule measuring up to 4 mm (series 6:371), unchanged. Peripheral groundglass opacity in the right lower lobe which is new compared with prior exam. Mild bibasilar dependent and platelike atelectasis.  PLEURA: No pleural effusion.  MEDIASTINUM AND CORI: Mildly enlarged right hilar lymph notes measuring up to 2.2 x 1.2 cm (series 6:231).  VESSELS: Adequate pulmonary arterial opacification. No pulmonary embolism. Main pulmonary artery is not dilated. Thoracic aorta is normal in caliber. Atherosclerotic calcifications of the thoracic aorta, proximal great vessels, and coronary arteries.  HEART: Heart size is normal. No pericardial effusion.  CHESTWALL AND LOWER NECK: Within normal limits.  VISUALIZED UPPER ABDOMEN: Cholecystectomy clips. Mild thickening of the adrenal glands.  BONES: Mild degenerative changes of the spine.    IMPRESSION:  No pulmonary embolism.    Peripheral groundglass opacity in the right lower lobe which may be infectious or inflammatory including atypical/viral infection such as COVID 19. Mildly enlarged right hilar lymph nodes may be reactive.    Otherwise, no interval change in lung findings compared to prior exam from 10/1/2016.    < end of copied text >  	      LABS:	 	  CARDIAC MARKERS ( 01 Aug 2020 18:49 )  x     / <0.01 ng/mL / 310 U/L / x     / 2.1 ng/mL  p-BNP 01 Aug 2020 18:49: x                              10.4   6.63  )-----------( 258      ( 03 Aug 2020 08:10 )             31.1     08-03    132<L>  |  96<L>  |  8.0  ----------------------------<  95  3.5   |  22.0  |  0.67    Ca    8.6      03 Aug 2020 07:56      proBNP:   Lipid Profile:   HgA1c:   TSH: Thyroid Stimulating Hormone, Serum: 0.39 uIU/mL        TELEMETRY: Reviewed  SR, PVCs, PACs, narrow complex tachycardia yesterday afternoon

## 2020-08-04 NOTE — PROGRESS NOTE ADULT - SUBJECTIVE AND OBJECTIVE BOX
HPI:     INTERVAL/OVERNIGHT EVENTS: No overnight  issues/events reported by ethe RN. Pt seen and examined at the bedside. Pt denies chest pain, nausea, vomiting, headaches, shortness of breath, palpitations, abdominal pain; says he feels fine.      PAST MEDICAL & SURGICAL HISTORY:  Tobacco abuse: Active daily Smoker 1ppd down from 4ppd  COPD with emphysema  Lung mass: Pt refusing surgical eval/biopsy/workup for mass  Anemia of folate deficiency  Brain tumor: s/p resection  Seizure: Hx Dilantin Toxicity  H/O brain surgery: s/p L Frontal Craniotomy    Vital Signs Last 24 Hrs  T(C): 37.1 (04 Aug 2020 08:06), Max: 38.2 (04 Aug 2020 05:15)  T(F): 98.8 (04 Aug 2020 08:06), Max: 100.8 (04 Aug 2020 05:15)  HR: 94 (04 Aug 2020 10:55) (77 - 178)  BP: 131/85 (04 Aug 2020 10:55) (112/67 - 132/76)  BP(mean): --  RR: 18 (04 Aug 2020 08:06) (17 - 18)  SpO2: 99% (04 Aug 2020 08:06) (96% - 99%)      PHYSICAL EXAM:  GENERAL:  Alert and oriented x4. Unlabored breathing, no distress.  HEAD: normocephalic and atraumatic  ENMT: EOM are intact. Mucous membranes are moist. Oroph. clear, erythema/exudates ,   NECK: Supple. No lymphadenopathy, no JVD  LUNGS: Clear to auscultation BL with no wheezing, rales or rhonchi;  HEART: Irregular rate and rhythm ,+S1/+S2, no murmurs, rubs, gallops  ABDOMEN: Soft, nontender, and nondistended, bowel sounds in all 4 quadrants  EXTREMITIES: Without any cyanosis, clubbing, rash, lesions or edema.  SKIN: No new rashes or lesions.  MSK: strength equal BL  VASCULAR: Radial and Dorsal pedal pulses palpable BL  NEUROLOGIC: Grossly intact.  PSYCH: No new changes.        08-03 @ 07:01  -  08-04 @ 07:00  --------------------------------------------------------  IN: 240 mL / OUT: 1350 mL / NET: -1110 mL    08-04 @ 07:01  -  08-04 @ 12:21  --------------------------------------------------------  IN: 0 mL / OUT: 450 mL / NET: -450 mL                              10.4   6.63  )-----------( 258      ( 03 Aug 2020 08:10 )             31.1     08-04    135  |  97<L>  |  6.0<L>  ----------------------------<  109<H>  3.4<L>   |  25.0  |  0.65    Ca    8.9      04 Aug 2020 11:34    MEDICATIONS  (STANDING):  chlorproMAZINE    Tablet 25 milliGRAM(s) Oral three times a day  diltiazem    Tablet 30 milliGRAM(s) Oral every 6 hours  enoxaparin Injectable 70 milliGRAM(s) SubCutaneous every 12 hours  folic acid 1 milliGRAM(s) Oral daily  levoFLOXacin  Tablet 750 milliGRAM(s) Oral every 24 hours  PHENobarbital 64.8 milliGRAM(s) Oral every 12 hours  phenytoin   Capsule 200 milliGRAM(s) Oral every 12 hours  potassium chloride   Powder 40 milliEquivalent(s) Oral once  sodium chloride 0.9%. 1000 milliLiter(s) (75 mL/Hr) IV Continuous <Continuous>  tiotropium 18 MICROgram(s) Capsule 1 Capsule(s) Inhalation daily    MEDICATIONS  (PRN):  acetaminophen   Tablet .. 650 milliGRAM(s) Oral every 6 hours PRN Temp greater or equal to 38C (100.4F), Mild Pain (1 - 3)  ALBUTerol    90 MICROgram(s) HFA Inhaler 2 Puff(s) Inhalation every 6 hours PRN Shortness of Breath and/or Wheezing  diltiazem Injectable 10 milliGRAM(s) IV Push every 4 hours PRN HR sustatined >120  nicotine - 21 mG/24Hr(s) Patch 1 patch Transdermal daily PRN Nicotien Withdrawal      RADIOLOGY & ADDITIONAL STUDIES:    X-ray:  reviewed by me.   < from: Xray Chest 1 View-PORTABLE IMMEDIATE (08.01.20 @ 21:58) >   EXAM:  XR CHEST PORTABLE IMMED 1V                          PROCEDURE DATE:  08/01/2020      INTERPRETATION:  CLINICAL INFORMATION: Hypoxia. Shortness of breath.    TECHNIQUE: Frontal view of the chest  COMPARISON: September 29, 2016.    FINDINGS:  LUNGS/PLEURA: Patchy consolidation in the right lower lung. No pleural effusion or pneumothorax.  MEDIASTINUM: Cardiomediastinal silhouette is unremarkable.  OTHER: None.    IMPRESSION: Right lower lung pneumonia.    CT scan:   < from: CT Angio Chest w/ IV Cont (08.01.20 @ 21:42) >  FINDINGS:    LUNGS AND AIRWAYS: Patent central airways. Mild diffuse centrilobular emphysema. Calcified nodular mass abutting the pleural surface measuring up to 3.4 cm in the left lung apex and additional nodular calcified scarlike opacities along the left upper mediastinum, unchanged compared with prior exam. Mild biapical pleural parenchymal scarring. Scarlike nodular opacity in the central right middle lobe (series 6:256), unchanged. Right middle lobe triangular-shaped nodule measuring up to 4 mm (series 6:371), unchanged. Peripheral groundglass opacity in the right lower lobe which is new compared with prior exam. Mild bibasilar dependent and platelike atelectasis.  PLEURA: No pleural effusion.  MEDIASTINUM AND CORI: Mildly enlarged right hilar lymph notes measuring up to 2.2 x 1.2 cm (series 6:231).  VESSELS: Adequate pulmonary arterial opacification. No pulmonary embolism. Main pulmonary artery is not dilated. Thoracic aorta is normal in caliber. Atherosclerotic calcifications of the thoracic aorta, proximal great vessels, and coronary arteries.  HEART: Heart size is normal. No pericardial effusion.  CHESTWALL AND LOWER NECK: Within normal limits.  VISUALIZED UPPER ABDOMEN: Cholecystectomy clips. Mild thickening of the adrenal glands.  BONES: Mild degenerative changes of the spine.    IMPRESSION:  No pulmonary embolism.  Peripheral groundglass opacity in the right lower lobe which may be infectious or inflammatory including atypical/viral infection such as COVID 19. Mildly enlarged right hilar lymph nodes may be reactive.  Otherwise, no interval change in lung findings compared to prior exam from 10/1/2016.

## 2020-08-04 NOTE — PROGRESS NOTE ADULT - ASSESSMENT
68 y/o M with PMHX 3.4cm L Lung Mass (known, refusing workup/surgical eval/biopsy), Hx Intracranial Tumor s/p L Frontal Craniotomy, Seizure Disorder, COPD/Emphysema, Tobacco Abuse/Active Smoker was brought to [facility] by his wife for evaluation of Hiccups x1 week, AMS, and Fever and was subsequently BIBEMS to [facility] ER for further evaluation. Hypoxia noted by EMS with O2sat 89% RA at rest improving to 99% on 6L NC. Fever Tmax 103.4F noted in Triage. +Associated Chills. +Nonproductive Cough/No sputum. Also c/o acute on chronic LE pain (hx LE trauma as a child). +Fatigue, +Gen Weakness. No CP. No N/V/D. No abd pain. No other issues. Pt noted to have significant hyponatremia on labs with Na 122. No leukocytosis but lymphocytopenia on differential. CXR Hyperinflated with RLL infiltrates. CTA Chest with emphysema, 3.4cm Left Lung Apical Mass, multiple other scar-like densities, unchanged from prior imaging. Negative for PE. +new GGO RLL and +R Hilar LAD possible Atypical/COVID PNA. LE Dopplers negative for DVT (LE pain is chronic per patient). CT head for AMS shows post-surgical changes from L Craniotomy otherwise no acute findings. Poor Historian at baseline, more difficult with AMS. Outpatient HIE Notes reviewed and EMR/Labs/Imaging reviewed.      Atrial Fibrillation?  Cardiology consult inputs appreciated.   Narrow Complex Tachycardia.  - -200s atrial tachycardia yesterday.  - Unclear if atrial fibrillation vs. flutter.   -Sinus arrhythmia today (8/04)  - Diltiazem 10mg IV q4 PRN HR>120  - Started Diltiazem 30mg PO q6  - [id number] 2 (Hx CAD, Age)  -Cont to anticoagulate with lovenox,  - Scheduled TTE 8/04  - Continue telemetry monitoring.   - If HR sustained in 200s, would try adenosine.     Acute Hypoxemic Respiratory Failure   -Resolved. Doing well off supplementary O2. sPO2 99% on room air  -2/2 COPD/Emphysema v Lung Mass. COVID-19 negative x2. Resolved. Doing well off Off O2 .  -EKG Sinus Rhythm 81 with +PACs, no acute ischemic changes in ER  -CXR Hyperinflated, Flat Diaphragms, +RLL Interstitial Infiltrates  -CTA Chest negative for PE. +RLL GGO likely COVID PNA. Unchanged 3.4cm Lung Mass.   -Cont Albuterol and Spiriva  -s/p Solumedrol 125mg IV x1 in ER  - AB pending  -Off Isolation Precautions; COVID19 neg x2    Hyponatremia  Nephrology consult eval and recs appreciated. Nephrology continues to follow pt.  -SIADH 2/2 Lung CA v COVID19 Hyponatremia v Medication-induced   -Na 135, normalized. Hyponatremia resolved  -Trend BMP    AMS   -infectious, PNA? TME 2/2 Hyponatremia. Resolved    L Lung Mass  -Per Outpatient PCP Kyaleen Cedeno - patient refused surgical evaluation, biopsy, further management for known lung mass. Was considering repeat CT imaging. Remains an active smoker 1ppd (previously up to 4ppd).   -CTA on admit with findings similar to prior CT except for GGO  -Conservative Management per patient wishes. Outpatient F/u.    COPD/Emphysema  -C/w Albuterol and Spiriva.   -CXR neg    Brain Tumor s/p L Frontal Craniotomy  -CTH with postsurgical changes and midline metallic findings. No acute findings.     Seizure Disorder, Hx Dilantin Toxicity  -Phenobarbital 64.8mg PO q12   -Phenytoin 200mg ER PO q12  -Seizure Precautions  -monitor Dilantin and Phenobarbital levels    Folate Deficiency Anemia  -Folic Acid 1mg PO q24    Tobacco Abuse/Current Smoker  - cessation. Active daily Smoker 1ppd. Nicotine Patch PRN.     DVT PPx  -Lovenox    Care discussed with Attending, Dr Staley.    Dispo: Likely pending result of TTE

## 2020-08-04 NOTE — PROGRESS NOTE ADULT - ASSESSMENT
HypoNatremia - improved- serum Na 135 today  Urine osm, Urine Na, serum uric acid level- noted   Has h/o L Lung Mass has refused workup treatment in past  Intracranial Tumor s/p L Frontal Craniotomy, Seizure Disorder, COPD  At admit hypoxic, febrile to 103F, w cough, fibringen > 1000  Covid checked -not detected? repeat neg value noted  Cont gentle IVF 12 hrs then stop- now off IVF  AM labs    Will follow

## 2020-08-04 NOTE — PROGRESS NOTE ADULT - SUBJECTIVE AND OBJECTIVE BOX
NEPHROLOGY INTERVAL HPI/OVERNIGHT EVENTS:    Examined  Feeling better  No distress    MEDICATIONS  (STANDING):  chlorproMAZINE    Tablet 25 milliGRAM(s) Oral three times a day  diltiazem    Tablet 30 milliGRAM(s) Oral every 6 hours  enoxaparin Injectable 70 milliGRAM(s) SubCutaneous every 12 hours  folic acid 1 milliGRAM(s) Oral daily  levoFLOXacin  Tablet 750 milliGRAM(s) Oral every 24 hours  PHENobarbital 64.8 milliGRAM(s) Oral every 12 hours  phenytoin   Capsule 200 milliGRAM(s) Oral every 12 hours  sodium chloride 0.9%. 1000 milliLiter(s) (75 mL/Hr) IV Continuous <Continuous>  tiotropium 18 MICROgram(s) Capsule 1 Capsule(s) Inhalation daily    MEDICATIONS  (PRN):  acetaminophen   Tablet .. 650 milliGRAM(s) Oral every 6 hours PRN Temp greater or equal to 38C (100.4F), Mild Pain (1 - 3)  ALBUTerol    90 MICROgram(s) HFA Inhaler 2 Puff(s) Inhalation every 6 hours PRN Shortness of Breath and/or Wheezing  diltiazem Injectable 10 milliGRAM(s) IV Push every 4 hours PRN HR sustatined >120  nicotine - 21 mG/24Hr(s) Patch 1 patch Transdermal daily PRN Nicotien Withdrawal      Allergies    No Known Allergies    Intolerances    paper tray only (Unknown)      Vital Signs Last 24 Hrs  T(C): 36.7 (04 Aug 2020 15:35), Max: 38.2 (04 Aug 2020 05:15)  T(F): 98 (04 Aug 2020 15:35), Max: 100.8 (04 Aug 2020 05:15)  HR: 94 (04 Aug 2020 15:35) (77 - 98)  BP: 132/76 (04 Aug 2020 15:35) (112/67 - 132/76)  BP(mean): --  RR: 18 (04 Aug 2020 15:35) (17 - 18)  SpO2: 99% (04 Aug 2020 15:35) (96% - 99%)  Daily     Daily     PHYSICAL EXAM:  GENERAL: appears chronically ill  HEAD:  Atraumatic, Normocephalic  EYES: EOMI  NECK: Supple, neck  veins full  NERVOUS SYSTEM:  Alert & Oriented X3  CHEST/LUNG: Clear to percussion bilaterally  HEART: Regular rate and rhythm  ABDOMEN: Soft, Nontender, Nondistended; Bowel sounds present  EXTREMITIES:   no edema    LABS:                        10.4   6.63  )-----------( 258      ( 03 Aug 2020 08:10 )             31.1     08-04    135  |  97<L>  |  6.0<L>  ----------------------------<  109<H>  3.4<L>   |  25.0  |  0.65    Ca    8.9      04 Aug 2020 11:34                  RADIOLOGY & ADDITIONAL TESTS:

## 2020-08-04 NOTE — PROGRESS NOTE ADULT - ASSESSMENT
A/P:  68 y/o M active smoker with a PMHx of MI, Lung Mass (known, refusing workup/surgical eval/biopsy), hx intracranial tumor s/p L Frontal Craniotomy, seizure disorder, and COPD/Emphysema who presented to the ED with complaints of hiccups, fever, and confusion. Patient states that he had been experiencing hiccups for a week, but also began to feel fevers and chills. Patient's wife brought him to [facility] where he was noted to be hypoxic, and was sent to the ED to be evaluated. Patient also noted a non-productive cough, but denies any shortness of breath. Patient's CTA chest showed RLL PNA and emphysema, no PE. Patient was seen by Dr. Cabral 3 years ago, and was advised to have an echo and nuclear stress test, but he never followed up. Patient states that he is feeling ok at this time, and denies any chest pain, palpitations, shortness of breath, leg swelling, abdominal pain, N/V/D, headache, or dizziness.   Troponin negative x 1    Narrow Complex Tachycardia  - -200s atrial tachycardia.  - Now in NSR with PACs, PVCs  - Echo still pending.   - Continue Diltiazem 30mg PO q6, with 10 IV q4 PRN HR>120.  - [id number] 2 (Hx CAD, Age)  - Would anticoagulate at this time with lovenox, will evaluate AC longterm pending any further atrial tachycardias,  - Continue telemetry monitoring.   - If HR sustained in 200s, would try adenosine or diltiazem gtt.    CAD  - Hx of MI?  - Was supposed to have echo and stress test as an outpatient, but never followed up.   - Would start on ASA 81mg PO qday  - Check fasting lipid panel in AM.   - Echo pending.     Pneumonia  - Covid negative x 2  - Management per primary team.     Preliminary evaluation, please await official recommendations by Dr. Esteves A/P:  70 y/o M active smoker with a PMHx of MI, Lung Mass (known, refusing workup/surgical eval/biopsy), hx intracranial tumor s/p L Frontal Craniotomy, seizure disorder, and COPD/Emphysema who presented to the ED with complaints of hiccups, fever, and confusion. Patient states that he had been experiencing hiccups for a week, but also began to feel fevers and chills. Patient's wife brought him to [facility] where he was noted to be hypoxic, and was sent to the ED to be evaluated. Patient also noted a non-productive cough, but denies any shortness of breath. Patient's CTA chest showed RLL PNA and emphysema, no PE. Patient was seen by Dr. Cabral 3 years ago, and was advised to have an echo and nuclear stress test, but he never followed up. Patient states that he is feeling ok at this time, and denies any chest pain, palpitations, shortness of breath, leg swelling, abdominal pain, N/V/D, headache, or dizziness.   Troponin negative x 1    Narrow Complex Tachycardia  - -200s atrial tachycardia.  - Now in NSR with PACs, PVCs  - Echo still pending.   - Continue telemetry monitoring.   - If HR sustained in 200s, would try adenosine or diltiazem gtt.    CAD  - Hx of MI?  - Was supposed to have echo and stress test as an outpatient, but never followed up.   - Would start on ASA 81mg PO qday  - Check fasting lipid panel in AM.   - Echo pending.     Pneumonia  - Covid negative x 2  - Management per primary team.

## 2020-08-04 NOTE — PROGRESS NOTE ADULT - ATTENDING COMMENTS
TTE reviewed- Normal LV function. No significant valvular pathology.   Reviewed EKGs, tele strips- all appear to be SVt/Atrial tachycardia. Upon further review, do not think this is afib considering normal LA size. Will hold AC at this time.   DC cardizem short acting and start long acting.

## 2020-08-05 ENCOUNTER — TRANSCRIPTION ENCOUNTER (OUTPATIENT)
Age: 70
End: 2020-08-05

## 2020-08-05 VITALS
TEMPERATURE: 98 F | HEART RATE: 97 BPM | OXYGEN SATURATION: 99 % | RESPIRATION RATE: 18 BRPM | SYSTOLIC BLOOD PRESSURE: 112 MMHG | DIASTOLIC BLOOD PRESSURE: 79 MMHG

## 2020-08-05 LAB
ANION GAP SERPL CALC-SCNC: 15 MMOL/L — SIGNIFICANT CHANGE UP (ref 5–17)
BUN SERPL-MCNC: 9 MG/DL — SIGNIFICANT CHANGE UP (ref 8–20)
CALCIUM SERPL-MCNC: 9 MG/DL — SIGNIFICANT CHANGE UP (ref 8.6–10.2)
CHLORIDE SERPL-SCNC: 95 MMOL/L — LOW (ref 98–107)
CO2 SERPL-SCNC: 24 MMOL/L — SIGNIFICANT CHANGE UP (ref 22–29)
CREAT SERPL-MCNC: 0.66 MG/DL — SIGNIFICANT CHANGE UP (ref 0.5–1.3)
GLUCOSE SERPL-MCNC: 105 MG/DL — HIGH (ref 70–99)
MAGNESIUM SERPL-MCNC: 1.8 MG/DL — SIGNIFICANT CHANGE UP (ref 1.8–2.6)
PHOSPHATE SERPL-MCNC: 3.6 MG/DL — SIGNIFICANT CHANGE UP (ref 2.4–4.7)
POTASSIUM SERPL-MCNC: 3.8 MMOL/L — SIGNIFICANT CHANGE UP (ref 3.5–5.3)
POTASSIUM SERPL-SCNC: 3.8 MMOL/L — SIGNIFICANT CHANGE UP (ref 3.5–5.3)
SODIUM SERPL-SCNC: 134 MMOL/L — LOW (ref 135–145)

## 2020-08-05 PROCEDURE — U0003: CPT

## 2020-08-05 PROCEDURE — 93005 ELECTROCARDIOGRAM TRACING: CPT

## 2020-08-05 PROCEDURE — 82746 ASSAY OF FOLIC ACID SERUM: CPT

## 2020-08-05 PROCEDURE — 85610 PROTHROMBIN TIME: CPT

## 2020-08-05 PROCEDURE — 83615 LACTATE (LD) (LDH) ENZYME: CPT

## 2020-08-05 PROCEDURE — 86803 HEPATITIS C AB TEST: CPT

## 2020-08-05 PROCEDURE — 83930 ASSAY OF BLOOD OSMOLALITY: CPT

## 2020-08-05 PROCEDURE — 87521 HEPATITIS C PROBE&RVRS TRNSC: CPT

## 2020-08-05 PROCEDURE — 70450 CT HEAD/BRAIN W/O DYE: CPT

## 2020-08-05 PROCEDURE — 82728 ASSAY OF FERRITIN: CPT

## 2020-08-05 PROCEDURE — 84300 ASSAY OF URINE SODIUM: CPT

## 2020-08-05 PROCEDURE — 96374 THER/PROPH/DIAG INJ IV PUSH: CPT | Mod: XU

## 2020-08-05 PROCEDURE — 94640 AIRWAY INHALATION TREATMENT: CPT

## 2020-08-05 PROCEDURE — 99239 HOSP IP/OBS DSCHRG MGMT >30: CPT

## 2020-08-05 PROCEDURE — 93970 EXTREMITY STUDY: CPT

## 2020-08-05 PROCEDURE — 80185 ASSAY OF PHENYTOIN TOTAL: CPT

## 2020-08-05 PROCEDURE — 93306 TTE W/DOPPLER COMPLETE: CPT

## 2020-08-05 PROCEDURE — 84145 PROCALCITONIN (PCT): CPT

## 2020-08-05 PROCEDURE — 84443 ASSAY THYROID STIM HORMONE: CPT

## 2020-08-05 PROCEDURE — 85379 FIBRIN DEGRADATION QUANT: CPT

## 2020-08-05 PROCEDURE — 80053 COMPREHEN METABOLIC PANEL: CPT

## 2020-08-05 PROCEDURE — 81001 URINALYSIS AUTO W/SCOPE: CPT

## 2020-08-05 PROCEDURE — 99232 SBSQ HOSP IP/OBS MODERATE 35: CPT

## 2020-08-05 PROCEDURE — 85384 FIBRINOGEN ACTIVITY: CPT

## 2020-08-05 PROCEDURE — 86769 SARS-COV-2 COVID-19 ANTIBODY: CPT

## 2020-08-05 PROCEDURE — 82607 VITAMIN B-12: CPT

## 2020-08-05 PROCEDURE — 83605 ASSAY OF LACTIC ACID: CPT

## 2020-08-05 PROCEDURE — 83735 ASSAY OF MAGNESIUM: CPT

## 2020-08-05 PROCEDURE — 82553 CREATINE MB FRACTION: CPT

## 2020-08-05 PROCEDURE — 85730 THROMBOPLASTIN TIME PARTIAL: CPT

## 2020-08-05 PROCEDURE — 84484 ASSAY OF TROPONIN QUANT: CPT

## 2020-08-05 PROCEDURE — 71275 CT ANGIOGRAPHY CHEST: CPT

## 2020-08-05 PROCEDURE — 82550 ASSAY OF CK (CPK): CPT

## 2020-08-05 PROCEDURE — 71045 X-RAY EXAM CHEST 1 VIEW: CPT

## 2020-08-05 PROCEDURE — 84550 ASSAY OF BLOOD/URIC ACID: CPT

## 2020-08-05 PROCEDURE — 85027 COMPLETE CBC AUTOMATED: CPT

## 2020-08-05 PROCEDURE — 80048 BASIC METABOLIC PNL TOTAL CA: CPT

## 2020-08-05 PROCEDURE — 96372 THER/PROPH/DIAG INJ SC/IM: CPT | Mod: XU

## 2020-08-05 PROCEDURE — 86140 C-REACTIVE PROTEIN: CPT

## 2020-08-05 PROCEDURE — 84100 ASSAY OF PHOSPHORUS: CPT

## 2020-08-05 PROCEDURE — 83935 ASSAY OF URINE OSMOLALITY: CPT

## 2020-08-05 PROCEDURE — 99285 EMERGENCY DEPT VISIT HI MDM: CPT | Mod: 25

## 2020-08-05 PROCEDURE — 96375 TX/PRO/DX INJ NEW DRUG ADDON: CPT

## 2020-08-05 PROCEDURE — 36415 COLL VENOUS BLD VENIPUNCTURE: CPT

## 2020-08-05 PROCEDURE — 82570 ASSAY OF URINE CREATININE: CPT

## 2020-08-05 PROCEDURE — 80184 ASSAY OF PHENOBARBITAL: CPT

## 2020-08-05 RX ORDER — POTASSIUM CHLORIDE 20 MEQ
40 PACKET (EA) ORAL ONCE
Refills: 0 | Status: COMPLETED | OUTPATIENT
Start: 2020-08-05 | End: 2020-08-05

## 2020-08-05 RX ORDER — NICOTINE POLACRILEX 2 MG
1 GUM BUCCAL
Qty: 30 | Refills: 0
Start: 2020-08-05 | End: 2020-09-03

## 2020-08-05 RX ORDER — TIOTROPIUM BROMIDE 18 UG/1
1 CAPSULE ORAL; RESPIRATORY (INHALATION)
Qty: 5 | Refills: 0
Start: 2020-08-05 | End: 2020-09-03

## 2020-08-05 RX ORDER — CIPROFLOXACIN LACTATE 400MG/40ML
1 VIAL (ML) INTRAVENOUS
Qty: 3 | Refills: 0
Start: 2020-08-05 | End: 2020-08-07

## 2020-08-05 RX ORDER — MAGNESIUM OXIDE 400 MG ORAL TABLET 241.3 MG
400 TABLET ORAL ONCE
Refills: 0 | Status: COMPLETED | OUTPATIENT
Start: 2020-08-05 | End: 2020-08-05

## 2020-08-05 RX ORDER — DILTIAZEM HCL 120 MG
1 CAPSULE, EXT RELEASE 24 HR ORAL
Qty: 30 | Refills: 0
Start: 2020-08-05 | End: 2020-09-03

## 2020-08-05 RX ORDER — ASPIRIN/CALCIUM CARB/MAGNESIUM 324 MG
81 TABLET ORAL DAILY
Refills: 0 | Status: DISCONTINUED | OUTPATIENT
Start: 2020-08-05 | End: 2020-08-05

## 2020-08-05 RX ORDER — IPRATROPIUM/ALBUTEROL SULFATE 18-103MCG
3 AEROSOL WITH ADAPTER (GRAM) INHALATION
Qty: 7 | Refills: 0
Start: 2020-08-05 | End: 2020-09-03

## 2020-08-05 RX ORDER — ENOXAPARIN SODIUM 100 MG/ML
40 INJECTION SUBCUTANEOUS DAILY
Refills: 0 | Status: DISCONTINUED | OUTPATIENT
Start: 2020-08-05 | End: 2020-08-05

## 2020-08-05 RX ORDER — ASPIRIN/CALCIUM CARB/MAGNESIUM 324 MG
1 TABLET ORAL
Qty: 30 | Refills: 0
Start: 2020-08-05 | End: 2020-09-03

## 2020-08-05 RX ORDER — ALBUTEROL 90 UG/1
2 AEROSOL, METERED ORAL
Qty: 10 | Refills: 0
Start: 2020-08-05 | End: 2020-09-03

## 2020-08-05 RX ORDER — LANOLIN ALCOHOL/MO/W.PET/CERES
1 CREAM (GRAM) TOPICAL ONCE
Refills: 0 | Status: COMPLETED | OUTPATIENT
Start: 2020-08-05 | End: 2020-08-05

## 2020-08-05 RX ADMIN — Medication 40 MILLIEQUIVALENT(S): at 11:10

## 2020-08-05 RX ADMIN — ENOXAPARIN SODIUM 40 MILLIGRAM(S): 100 INJECTION SUBCUTANEOUS at 11:06

## 2020-08-05 RX ADMIN — Medication 64.8 MILLIGRAM(S): at 05:07

## 2020-08-05 RX ADMIN — MAGNESIUM OXIDE 400 MG ORAL TABLET 400 MILLIGRAM(S): 241.3 TABLET ORAL at 11:10

## 2020-08-05 RX ADMIN — Medication 81 MILLIGRAM(S): at 11:07

## 2020-08-05 RX ADMIN — TIOTROPIUM BROMIDE 1 CAPSULE(S): 18 CAPSULE ORAL; RESPIRATORY (INHALATION) at 08:24

## 2020-08-05 RX ADMIN — Medication 1 MILLIGRAM(S): at 03:10

## 2020-08-05 RX ADMIN — Medication 25 MILLIGRAM(S): at 05:06

## 2020-08-05 RX ADMIN — Medication 120 MILLIGRAM(S): at 05:07

## 2020-08-05 RX ADMIN — Medication 1 MILLIGRAM(S): at 11:06

## 2020-08-05 RX ADMIN — Medication 200 MILLIGRAM(S): at 05:06

## 2020-08-05 NOTE — PROGRESS NOTE ADULT - ASSESSMENT
A/P:  68 y/o M active smoker with a PMHx of MI, Lung Mass (known, refusing workup/surgical eval/biopsy), hx intracranial tumor s/p L Frontal Craniotomy, seizure disorder, and COPD/Emphysema who presented to the ED with complaints of hiccups, fever, and confusion. Patient states that he had been experiencing hiccups for a week, but also began to feel fevers and chills. Patient's wife brought him to [facility] where he was noted to be hypoxic, and was sent to the ED to be evaluated. Patient also noted a non-productive cough, but denies any shortness of breath. Patient's CTA chest showed RLL PNA and emphysema, no PE. Patient was seen by Dr. Cabral 3 years ago, and was advised to have an echo and nuclear stress test, but he never followed up. Patient states that he is feeling ok at this time, and denies any chest pain, palpitations, shortness of breath, leg swelling, abdominal pain, N/V/D, headache, or dizziness.   Troponin negative x 1    Narrow Complex Tachycardia  - -200s atrial tachycardia.  - Echo with normal EF and normal LA.  - Does not appear to be AFib.   - Lovenox for AC D/C'd, no longer necessary.   - Continue Diltiazem CD, can uptitrate as needed.   - Cont telemetry monitoring.   - If HR sustained in 200s, would try adenosine or diltiazem gtt.    CAD  - Hx of MI?  - Was supposed to have echo and stress test as an outpatient, but never followed up.   - Cont ASA 81mg PO qday  - Check fasting lipid panel in AM.   - Echo normal.   - Ischemic evaluation as an outpatient.    Pneumonia  - Covid negative x 2  - Management per primary team.     Will sign off at this time, please feel free to call if any questions or issues arise. Thank you.

## 2020-08-05 NOTE — DISCHARGE NOTE PROVIDER - PROVIDER TOKENS
FREE:[LAST:[primary care],PHONE:[(   )    -],FAX:[(   )    -]],PROVIDER:[TOKEN:[[id number]]],PROVIDER:[TOKEN:[[id number]]] PROVIDER:[TOKEN:[[id number]]],PROVIDER:[TOKEN:[[id number]]],FREE:[LAST:[primary care],PHONE:[(   )    -],FAX:[(   )    -]],PROVIDER:[TOKEN:[[id number]]]

## 2020-08-05 NOTE — DISCHARGE NOTE PROVIDER - NSDCCPCAREPLAN_GEN_ALL_CORE_FT
PRINCIPAL DISCHARGE DIAGNOSIS  Diagnosis: Hyponatremia syndrome  Assessment and Plan of Treatment:       SECONDARY DISCHARGE DIAGNOSES  Diagnosis: Delirium  Assessment and Plan of Treatment:     Diagnosis: Hypoxia  Assessment and Plan of Treatment:

## 2020-08-05 NOTE — CHART NOTE - NSCHARTNOTEFT_GEN_A_CORE
Spoke to Dr. Vega regarding Palliative Care consult for today - As per Dr. Vega consult for Palliative Care is no long necessary.  Order discontinued in [place].    Thank you for the opportunity to assist with the care of this patient.   [facility] [phone].

## 2020-08-05 NOTE — DISCHARGE NOTE NURSING/CASE MANAGEMENT/SOCIAL WORK - NSDCPEEMAIL_GEN_ALL_CORE
[facility] Tobacco Control email [email or web address]

## 2020-08-05 NOTE — DISCHARGE NOTE PROVIDER - NSDCMRMEDTOKEN_GEN_ALL_CORE_FT
albuterol 90 mcg/inh inhalation aerosol: 2 puff(s) inhaled every 6 hours, As needed, Shortness of Breath and/or Wheezing  aspirin 81 mg oral tablet, chewable: 1 tab(s) orally once a day  dilTIAZem 120 mg/24 hours oral capsule, extended release: 1 cap(s) orally once a day  folic acid 1 mg oral tablet: 1 tab(s) orally once a day  ipratropium-albuterol 0.5 mg-2.5 mg/3 mLinhalation solution: 3 milliliter(s) by nebulizer every 6 hours, As Needed   levoFLOXacin 750 mg oral tablet: 1 tab(s) orally every 24 hours  nebulizer machine: 1   nicotine 21 mg/24 hr transdermal film, extended release: 1 patch transdermal once a day  PHENobarbital 64.8 mg oral tablet: 1 tab(s) orally every 12 hours MDD:129.6 mg  phenytoin 200 mg oral capsule, extended release: 1 cap(s) orally 2 times a day MDD:400mg  tiotropium 18 mcg inhalation capsule: 1 cap(s) inhaled once a day

## 2020-08-05 NOTE — DISCHARGE NOTE PROVIDER - NSDCFUSCHEDAPPT_GEN_ALL_CORE_FT
STONE VIVEROS ; 08/20/2020 ; [facility] [address] [facility] STONE VIVEROS ; 08/20/2020 ; [facility] [address] [facility]

## 2020-08-05 NOTE — DISCHARGE NOTE PROVIDER - HOSPITAL COURSE
70 y/o M with PMHX 3.4cm L Lung Mass (known, refusing workup/surgical eval/biopsy), Hx Intracranial Tumor s/p L Frontal Craniotomy, Seizure Disorder, COPD/Emphysema, Tobacco Abuse/Active Smoker was brought to [facility] by his wife for evaluation of Hiccups x1 week, AMS, and Fever and was subsequently BIBEMS to [facility] ER for further evaluation. Hypoxia noted by EMS with O2sat 89% RA at rest improving to 99% on 6L NC. Fever Tmax 103.4F noted in Triage. +Associated Chills. +Nonproductive Cough/No sputum. Also c/o acute on chronic LE pain (hx LE trauma as a child). +Fatigue, +Gen Weakness.        .Pt noted to have significant hyponatremia on labs with Na 122. CXR Hyperinflated with RLL infiltrates. CTA Chest with emphysema, 3.4cm Left Lung Apical Mass, multiple other scar-like densities, unchanged from prior imaging. Negative for PE. +new GGO RLL and +R Hilar LAD          LE Dopplers negative for DVT (LE pain is chronic per patient). CT head for AMS shows post-surgical changes from L Craniotomy otherwise no acute findings.        Atrial Fibrillation    - asa     --.. tte within normal limtis    - dilt on dc         Acute Hypoxemic Respiratory Failure     -Resolved. Doing well off supplementary O2.    -2/2 COPD/Emphysema v Lung Mass. COVID-19 negative x2. Resolved. Doing well off Off O2 .    -CTA Chest negative for PE. +RLL GGO likely COVID PNA. Unchanged 3.4cm Lung Mass.     -Cont Albuterol and Spiriva    -dc with nebs        Hyponatremia    Nephrology consult eval and recs appreciated. Nephrology continues to follow pt.    -SIADH 2/2 Lung CA v COVID19 Hyponatremia v Medication-induced     - advised to fluid restrice        L Lung Mass    -Per Outpatient PCP Kayleen Cedeno - patient refused surgical evaluation, biopsy, further management for known lung mass. Was considering repeat CT imaging. Remains an active smoker 1ppd (previously up to 4ppd).     -CTA on admit with findings similar to prior CT except for GGO    -Conservative Management per patient wishes. Outpatient F/u.        COPD/Emphysema    -C/w Albuterol and Spiriva.     -CXR neg        Brain Tumor s/p L Frontal Craniotomy    -CTH with postsurgical changes and midline metallic findings. No acute findings.         Seizure Disorder, Hx Dilantin Toxicity    -Phenobarbital 64.8mg PO q12     -Phenytoin 200mg ER PO q12        dispo dc today time spent oin dc 35 minutres        pe    GENERAL:  Alert and oriented x4. Unlabored breathing, no distress.    HEAD: normocephalic and atraumatic    ENMT: EOM are intact. Mucous membranes are moist. Oroph. clear, erythema/exudates ,     LUNGS: Clear to auscultation BL with no wheezing, rales or rhonchi;    HEART: Irregular rate and rhythm ,+S1/+S2, no murmurs, rubs, gallops    ABDOMEN: Soft, nontender, and nondistended, bowel sounds in all 4 quadrants    EXTREMITIES: Without any cyanosis, clubbing, rash, lesions or edema.    SKIN: No new rashes or lesions.    NEUROLOGIC: Grossly intact.        full code    high risk for readmission 68 y/o M with PMHX 3.4cm L Lung Mass (known, refusing workup/surgical eval/biopsy), Hx Intracranial Tumor s/p L Frontal Craniotomy, Seizure Disorder, COPD/Emphysema, Tobacco Abuse/Active Smoker was brought to [facility] by his wife for evaluation of Hiccups x1 week, AMS, and Fever and was subsequently BIBEMS to [facility] ER for further evaluation. Hypoxia noted by EMS with O2sat 89% RA at rest improving to 99% on 6L NC. Fever Tmax 103.4F noted in Triage. +Associated Chills. +Nonproductive Cough/No sputum. Also c/o acute on chronic LE pain (hx LE trauma as a child). +Fatigue, +Gen Weakness.        .Pt noted to have significant hyponatremia on labs with Na 122. CXR Hyperinflated with RLL infiltrates. CTA Chest with emphysema, 3.4cm Left Lung Apical Mass, multiple other scar-like densities, unchanged from prior imaging. Negative for PE. +new GGO RLL and +R Hilar LAD          LE Dopplers negative for DVT (LE pain is chronic per patient). CT head for AMS shows post-surgical changes from L Craniotomy otherwise no acute findings.        Atrial Fibrillation    - asa     --.. tte within normal limtis    - dilt on dc         Acute Hypoxemic Respiratory Failure     -Resolved. Doing well off supplementary O2.    -2/2 COPD/Emphysema v Lung Mass. COVID-19 negative x2. Resolved. Doing well off Off O2 .    -CTA Chest negative for PE. +RLL GGO likely COVID PNA. Unchanged 3.4cm Lung Mass.     -Cont Albuterol and Spiriva    -dc with nebs        Hyponatremia    Nephrology consult eval and recs appreciated. Nephrology continues to follow pt.    -SIADH 2/2 Lung CA v COVID19 Hyponatremia v Medication-induced     - advised to fluid restrice        L Lung Mass    -Per Outpatient PCP Kayleen Cedeno - patient refused surgical evaluation, biopsy, further management for known lung mass. Was considering repeat CT imaging. Remains an active smoker 1ppd (previously up to 4ppd).     -CTA on admit with findings similar to prior CT except for GGO    -Conservative Management per patient wishes. Outpatient F/u.        COPD/Emphysema    -C/w Albuterol and Spiriva.     -CXR neg        Brain Tumor s/p L Frontal Craniotomy    -[facility] with postsurgical changes and midline metallic findings. No acute findings.         Seizure Disorder, Hx Dilantin Toxicity    -Phenobarbital 64.8mg PO q12     -Phenytoin 200mg ER PO q12        LUNG mass - advised to follow up     with [facility]    --> spoke to wife and she understood all directions.         dispo dc today time spent oin dc 35 minutres        pe    GENERAL:  Alert and oriented x4. Unlabored breathing, no distress.    HEAD: normocephalic and atraumatic    ENMT: EOM are intact. Mucous membranes are moist. Oroph. clear, erythema/exudates ,     LUNGS: Clear to auscultation BL with no wheezing, rales or rhonchi;    HEART: Irregular rate and rhythm ,+S1/+S2, no murmurs, rubs, gallops    ABDOMEN: Soft, nontender, and nondistended, bowel sounds in all 4 quadrants    EXTREMITIES: Without any cyanosis, clubbing, rash, lesions or edema.    SKIN: No new rashes or lesions.    NEUROLOGIC: Grossly intact.        full code    high risk for readmission

## 2020-08-05 NOTE — DISCHARGE NOTE NURSING/CASE MANAGEMENT/SOCIAL WORK - NSDCPEWEB_GEN_ALL_CORE
[place] website --- [email or web address] website --- [email or web address]

## 2020-08-05 NOTE — DISCHARGE NOTE PROVIDER - CARE PROVIDER_API CALL
primary care,   Phone: (   )    -  Fax: (   )    -  Follow Up Time:     Hamzah Ulrich  INTERNAL MEDICINE  [address] [place] [address]  Phone: [phone]  Fax: [phone]  Follow Up Time:     Fady Esteves  CARDIOVASCULAR DISEASE  [address] [place] [address]  Phone: [phone]  Fax: [phone]  Follow Up Time: Hamzah Ulrich  INTERNAL MEDICINE  [address] [place] [address]  Phone: [phone]  Fax: [phone]  Follow Up Time:     Fady Esteves  CARDIOVASCULAR DISEASE  [address] [place] [address]  Phone: [phone]  Fax: [phone]  Follow Up Time:     primary care,   Phone: (   )    -  Fax: (   )    -  Follow Up Time:     Ra Calvillo)  Hematology; Internal Medicine; Medical Oncology  [address] [place] [address]  Phone: [phone]  Fax: [phone]  Follow Up Time:

## 2020-08-05 NOTE — PROGRESS NOTE ADULT - ASSESSMENT
HypoNatremia - improved- serum Na 134 today  Urine osm, Urine Na, serum uric acid level- noted   Has h/o L Lung Mass has refused workup treatment in past  Intracranial Tumor s/p L Frontal Craniotomy, Seizure Disorder, COPD

## 2020-08-05 NOTE — PROGRESS NOTE ADULT - SUBJECTIVE AND OBJECTIVE BOX
[place] CARDIOLOGY-[facility]                                                               Office: [address] [address]                                                              Telephone: [phone]. Fax:[phone]                                                                             PROGRESS NOTE  Reason for follow up: Narrow complex tachycardia  Overnight: No new events.   Update: Patient maintaining NSR with PVCs/PACs, no further episodes of NCT. Patient had 3 beats of NSVT. Patient states he is feeling well, has no palpitations, chest pain, or dyspnea. Covid negative x 2.       Review of symptoms:   Cardiac:  No chest pain. No dyspnea. No palpitations.  Respiratory: No cough. No dyspnea  Gastrointestinal: No diarrhea. No abdominal pain. No bleeding.     Past medical history: No updates.     Vital Signs Last 24 Hrs  T(C): 36.6 (05 Aug 2020 08:11), Max: 37.9 (04 Aug 2020 16:45)  T(F): 97.9 (05 Aug 2020 08:11), Max: 100.3 (04 Aug 2020 16:45)  HR: 87 (05 Aug 2020 08:25) (78 - 96)  BP: 118/82 (05 Aug 2020 08:11) (118/82 - 135/77)  BP(mean): --  RR: 20 (05 Aug 2020 09:29) (18 - 20)  SpO2: 93% (05 Aug 2020 09:29) (93% - 99%)    Weight (kg): 68 (08-01 @ 18:01)      PHYSICAL EXAM:  Appearance: Comfortable. No acute distress  HEENT:  Head and neck: Atraumatic. Normocephalic.  Normal oral mucosa, PERRL, Neck is supple. No JVD, No carotid bruit.   Neurologic: A&Ox 3, no focal deficits. EOMI, Cranial nerves are intact.  Lymphatic: No cervical lymphadenopathy  Cardiovascular: Normal S1 S2, No murmur, rubs/gallops. No JVD, No edema  Respiratory: Bibasilar rales and rhonchi, R>L  Gastrointestinal:  Soft, Non-tender, + BS  Lower Extremities: No edema  Psychiatry: Patient is calm. No agitation. Mood & affect appropriate  Skin: No rashes/ecchymoses/cyanosis/ulcers visualized on the face, hands or feet.    MEDICATIONS  (STANDING):  aspirin  chewable 81 milliGRAM(s) Oral daily  diltiazem    milliGRAM(s) Oral daily  enoxaparin Injectable 40 milliGRAM(s) SubCutaneous daily  folic acid 1 milliGRAM(s) Oral daily  levoFLOXacin  Tablet 750 milliGRAM(s) Oral every 24 hours  magnesium oxide 400 milliGRAM(s) Oral once  PHENobarbital 64.8 milliGRAM(s) Oral every 12 hours  phenytoin   Capsule 200 milliGRAM(s) Oral every 12 hours  potassium chloride    Tablet ER 40 milliEquivalent(s) Oral once  tiotropium 18 MICROgram(s) Capsule 1 Capsule(s) Inhalation daily    MEDICATIONS  (PRN):  acetaminophen   Tablet .. 650 milliGRAM(s) Oral every 6 hours PRN Temp greater or equal to 38C (100.4F), Mild Pain (1 - 3)  ALBUTerol    90 MICROgram(s) HFA Inhaler 2 Puff(s) Inhalation every 6 hours PRN Shortness of Breath and/or Wheezing  diltiazem Injectable 10 milliGRAM(s) IV Push every 4 hours PRN HR sustatined >120  nicotine - 21 mG/24Hr(s) Patch 1 patch Transdermal daily PRN Nicotien Withdrawal      DIAGNOSTIC TESTING:  [ ] Echocardiogram: Pending   OTHER:   < from: CT Angio Chest w/ IV Cont (08.01.20 @ 21:42) >     EXAM:  CT ANGIO CHEST (W)AW IC                          PROCEDURE DATE:  08/01/2020          INTERPRETATION:  CLINICAL INFORMATION: Pain and hiccups with hypoxia.    COMPARISON: 10/1/2016.    PROCEDURE:  CT Angiography of the Chest.  63 ml of Omnipaque 350 was injected intravenously.  Sagittal and coronal reformats were performed as well as 3D (MIP) reconstructions.    FINDINGS:    LUNGS AND AIRWAYS: Patent central airways. Mild diffuse centrilobular emphysema. Calcified nodular mass abutting the pleural surface measuring up to 3.4 cm in the left lung apex and additional nodular calcified scarlike opacities along the left upper mediastinum, unchanged compared with prior exam. Mild biapical pleural parenchymal scarring. Scarlike nodular opacity in the central right middle lobe (series 6:256), unchanged. Right middle lobe triangular-shaped nodule measuring up to 4 mm (series 6:371), unchanged. Peripheral groundglass opacity in the right lower lobe which is new compared with prior exam. Mild bibasilar dependent and platelike atelectasis.  PLEURA: No pleural effusion.  MEDIASTINUM AND CORI: Mildly enlarged right hilar lymph notes measuring up to 2.2 x 1.2 cm (series 6:231).  VESSELS: Adequate pulmonary arterial opacification. No pulmonary embolism. Main pulmonary artery is not dilated. Thoracic aorta is normal in caliber. Atherosclerotic calcifications of the thoracic aorta, proximal great vessels, and coronary arteries.  HEART: Heart size is normal. No pericardial effusion.  CHESTWALL AND LOWER NECK: Within normal limits.  VISUALIZED UPPER ABDOMEN: Cholecystectomy clips. Mild thickening of the adrenal glands.  BONES: Mild degenerative changes of the spine.    IMPRESSION:  No pulmonary embolism.    Peripheral groundglass opacity in the right lower lobe which may be infectious or inflammatory including atypical/viral infection such as COVID 19. Mildly enlarged right hilar lymph nodes may be reactive.    Otherwise, no interval change in lung findings compared to prior exam from 10/1/2016.    < end of copied text >  	      LABS:	 	  CARDIAC MARKERS ( 01 Aug 2020 18:49 )  x     / <0.01 ng/mL / 310 U/L / x     / 2.1 ng/mL  p-BNP 01 Aug 2020 18:49: x          08-05    134<L>  |  95<L>  |  9.0  ----------------------------<  105<H>  3.8   |  24.0  |  0.66    Ca    9.0      05 Aug 2020 07:49  Phos  3.6     08-05  Mg     1.8     08-05          proBNP:   Lipid Profile:   HgA1c:   TSH: Thyroid Stimulating Hormone, Serum: 0.39 uIU/mL        TELEMETRY: Reviewed  SR, PVCs, PACs, 3 beats of NSVT

## 2020-08-05 NOTE — PROGRESS NOTE ADULT - SUBJECTIVE AND OBJECTIVE BOX
NEPHROLOGY INTERVAL HPI/OVERNIGHT EVENTS:    feels well   No new evevnts     MEDICATIONS  (STANDING):  aspirin  chewable 81 milliGRAM(s) Oral daily  diltiazem    milliGRAM(s) Oral daily  enoxaparin Injectable 40 milliGRAM(s) SubCutaneous daily  folic acid 1 milliGRAM(s) Oral daily  levoFLOXacin  Tablet 750 milliGRAM(s) Oral every 24 hours  PHENobarbital 64.8 milliGRAM(s) Oral every 12 hours  phenytoin   Capsule 200 milliGRAM(s) Oral every 12 hours  tiotropium 18 MICROgram(s) Capsule 1 Capsule(s) Inhalation daily    MEDICATIONS  (PRN):  acetaminophen   Tablet .. 650 milliGRAM(s) Oral every 6 hours PRN Temp greater or equal to 38C (100.4F), Mild Pain (1 - 3)  ALBUTerol    90 MICROgram(s) HFA Inhaler 2 Puff(s) Inhalation every 6 hours PRN Shortness of Breath and/or Wheezing  diltiazem Injectable 10 milliGRAM(s) IV Push every 4 hours PRN HR sustatined >120  nicotine - 21 mG/24Hr(s) Patch 1 patch Transdermal daily PRN Nicotien Withdrawal      Allergies    No Known Allergies    Intolerances    paper tray only (Unknown)        Vital Signs Last 24 Hrs  T(C): 36.6 (05 Aug 2020 08:11), Max: 37.9 (04 Aug 2020 16:45)  T(F): 97.9 (05 Aug 2020 08:11), Max: 100.3 (04 Aug 2020 16:45)  HR: 87 (05 Aug 2020 08:25) (78 - 96)  BP: 118/82 (05 Aug 2020 08:11) (118/82 - 135/77)  BP(mean): --  RR: 20 (05 Aug 2020 09:29) (18 - 20)  SpO2: 93% (05 Aug 2020 09:29) (93% - 99%)  Daily     Daily   I&O's Detail    04 Aug 2020 07:01  -  05 Aug 2020 07:00  --------------------------------------------------------  IN:  Total IN: 0 mL    OUT:    Voided: 1700 mL  Total OUT: 1700 mL    Total NET: -1700 mL        I&O's Summary    04 Aug 2020 07:01  -  05 Aug 2020 07:00  --------------------------------------------------------  IN: 0 mL / OUT: 1700 mL / NET: -1700 mL        PHYSICAL EXAM:  GENERAL: appears chronically ill  HEAD:  Atraumatic, Normocephalic  EYES: EOMI  NECK: Supple, neck  veins full  NERVOUS SYSTEM:  Alert & Oriented X3  CHEST/LUNG: Clear to percussion bilaterally  HEART: Regular rate and rhythm  ABDOMEN: Soft, Nontender, Nondistended; Bowel sounds present  EXTREMITIES:   no edema  LABS:    08-05    134<L>  |  95<L>  |  9.0  ----------------------------<  105<H>  3.8   |  24.0  |  0.66    Ca    9.0      05 Aug 2020 07:49  Phos  3.6     08-05  Mg     1.8     08-05          Phosphorus Level, Serum: 3.6 mg/dL (08-05 @ 07:48)  Magnesium, Serum: 1.8 mg/dL (08-05 @ 07:48)          RADIOLOGY & ADDITIONAL TESTS:

## 2020-08-05 NOTE — DISCHARGE NOTE PROVIDER - CARE PROVIDERS DIRECT ADDRESSES
,DirectAddress_Unknown,DirectAddress_Unknown,[email or web address] ,DirectAddress_Unknown,[email or web address],DirectAddress_Unknown,[email or web address]

## 2020-08-10 PROBLEM — J43.9 EMPHYSEMA, UNSPECIFIED: Chronic | Status: ACTIVE | Noted: 2020-08-02

## 2020-08-10 PROBLEM — D52.9 FOLATE DEFICIENCY ANEMIA, UNSPECIFIED: Chronic | Status: ACTIVE | Noted: 2020-08-02

## 2020-08-10 PROBLEM — Z72.0 TOBACCO USE: Chronic | Status: ACTIVE | Noted: 2020-08-02

## 2020-08-10 PROBLEM — R91.8 OTHER NONSPECIFIC ABNORMAL FINDING OF LUNG FIELD: Chronic | Status: ACTIVE | Noted: 2020-08-02

## 2020-08-10 LAB
CORTICOSTEROID BINDING GLOBULIN RESULT: 2.5 MG/DL — SIGNIFICANT CHANGE UP
CORTIS F/TOTAL MFR SERPL: 4.4 % — SIGNIFICANT CHANGE UP
CORTIS SERPL-MCNC: 9.8 UG/DL — SIGNIFICANT CHANGE UP
CORTISOL, FREE RESULT: 0.43 UG/DL — SIGNIFICANT CHANGE UP

## 2020-08-11 ENCOUNTER — NON-APPOINTMENT (OUTPATIENT)
Age: 70
End: 2020-08-11

## 2020-08-11 ENCOUNTER — APPOINTMENT (OUTPATIENT)
Dept: CARDIOLOGY | Facility: CLINIC | Age: 70
End: 2020-08-11
Payer: MEDICARE

## 2020-08-11 VITALS
DIASTOLIC BLOOD PRESSURE: 73 MMHG | BODY MASS INDEX: 18.29 KG/M2 | WEIGHT: 138 LBS | HEART RATE: 73 BPM | RESPIRATION RATE: 16 BRPM | HEIGHT: 73 IN | TEMPERATURE: 98.2 F | SYSTOLIC BLOOD PRESSURE: 124 MMHG | OXYGEN SATURATION: 96 %

## 2020-08-11 VITALS — SYSTOLIC BLOOD PRESSURE: 125 MMHG | DIASTOLIC BLOOD PRESSURE: 72 MMHG

## 2020-08-11 DIAGNOSIS — Z13.6 ENCOUNTER FOR SCREENING FOR CARDIOVASCULAR DISORDERS: ICD-10-CM

## 2020-08-11 DIAGNOSIS — R41.0 DISORIENTATION, UNSPECIFIED: ICD-10-CM

## 2020-08-11 DIAGNOSIS — Z87.898 PERSONAL HISTORY OF OTHER SPECIFIED CONDITIONS: ICD-10-CM

## 2020-08-11 DIAGNOSIS — Z87.09 PERSONAL HISTORY OF OTHER DISEASES OF THE RESPIRATORY SYSTEM: ICD-10-CM

## 2020-08-11 DIAGNOSIS — R20.0 ANESTHESIA OF SKIN: ICD-10-CM

## 2020-08-11 DIAGNOSIS — Z87.01 PERSONAL HISTORY OF PNEUMONIA (RECURRENT): ICD-10-CM

## 2020-08-11 DIAGNOSIS — Z86.79 PERSONAL HISTORY OF OTHER DISEASES OF THE CIRCULATORY SYSTEM: ICD-10-CM

## 2020-08-11 DIAGNOSIS — E87.1 HYPO-OSMOLALITY AND HYPONATREMIA: ICD-10-CM

## 2020-08-11 DIAGNOSIS — R09.02 HYPOXEMIA: ICD-10-CM

## 2020-08-11 PROCEDURE — 93000 ELECTROCARDIOGRAM COMPLETE: CPT

## 2020-08-11 PROCEDURE — 99214 OFFICE O/P EST MOD 30 MIN: CPT

## 2020-08-11 RX ORDER — ALBUTEROL 90 MCG
90 AEROSOL (GRAM) INHALATION 4 TIMES DAILY
Refills: 0 | Status: ACTIVE | COMMUNITY

## 2020-08-11 RX ORDER — NICOTINE 21 MG/24HR
21 PATCH, TRANSDERMAL 24 HOURS TRANSDERMAL DAILY
Qty: 7 | Refills: 0 | Status: DISCONTINUED | COMMUNITY
End: 2020-08-11

## 2020-08-11 RX ORDER — KRILL/OM-3/DHA/EPA/PHOSPHO/AST 1000-230MG
81 CAPSULE ORAL
Refills: 0 | Status: ACTIVE | COMMUNITY

## 2020-08-11 RX ORDER — IPRATROPIUM/ALBUTEROL SULFATE 0.5-3MG/3
0.5-2.5 (3) AMPUL FOR NEBULIZATION (ML) INHALATION
Refills: 0 | Status: ACTIVE | COMMUNITY

## 2020-08-11 NOTE — DISCUSSION/SUMMARY
[Patient] : the patient [Benefits] : benefits [Risks] : risks [Alternatives] : alternatives [___ Month(s)] : [unfilled] month(s) [With Me] : with me [FreeTextEntry1] : This is a 69 year old male with hsitory ofsmoking , COPD emphysema, with atrial tachya rrhythmia with coronary calcifications and dyspnea on exertion \par 1) dyspnea on exertion and coronary caclfications:  intermediate risk factors for coronary artery disease.   Nuclear stress test with IV technetium radiotracer. \par 2) atrial tachyarrhyhtmia: ? diagnosis of afib: Likley atrial tachcyardia. due to COPD. not on  anticoagulation 4 weeks event monitor. \par 3) Lipid profile. \par 4) hyponatrmia: liberal salt iontake. Blood work. \par 5) AAA screeing: US aorta.  > 40 pack year history of smoking. \par 6) claudications: LE arterial Duplex US and MEREDITH. \par 7) Smoking cessation counselling addressed. Quit date today.  Time spent > 10 mins.   Patient motivated. Offered patches and/or meds. Patient states she does not need them and is confident.  Nicotine patches ordered.   \par \par

## 2020-08-11 NOTE — REASON FOR VISIT
[Initial Evaluation] : an initial evaluation of [FreeTextEntry1] : atrial tachycardia. , dyspnea on exertion and COPD , smoking addiction

## 2020-08-11 NOTE — PHYSICAL EXAM
[General Appearance - Well Developed] : well developed [Normal Appearance] : normal appearance [Well Groomed] : well groomed [General Appearance - Well Nourished] : well nourished [No Deformities] : no deformities [Normal Conjunctiva] : the conjunctiva exhibited no abnormalities [General Appearance - In No Acute Distress] : no acute distress [Eyelids - No Xanthelasma] : the eyelids demonstrated no xanthelasmas [Normal Oral Mucosa] : normal oral mucosa [No Oral Pallor] : no oral pallor [No Oral Cyanosis] : no oral cyanosis [Normal Jugular Venous A Waves Present] : normal jugular venous A waves present [Normal Jugular Venous V Waves Present] : normal jugular venous V waves present [No Jugular Venous Gaitan A Waves] : no jugular venous gaitan A waves [Respiration, Rhythm And Depth] : normal respiratory rhythm and effort [Exaggerated Use Of Accessory Muscles For Inspiration] : no accessory muscle use [Auscultation Breath Sounds / Voice Sounds] : lungs were clear to auscultation bilaterally [Heart Rate And Rhythm] : heart rate and rhythm were normal [Heart Sounds] : normal S1 and S2 [Murmurs] : no murmurs present [Abdomen Soft] : soft [Abdomen Tenderness] : non-tender [Abdomen Mass (___ Cm)] : no abdominal mass palpated [Gait - Sufficient For Exercise Testing] : the gait was sufficient for exercise testing [Abnormal Walk] : normal gait [Cyanosis, Localized] : no localized cyanosis [Nail Clubbing] : no clubbing of the fingernails [Skin Color & Pigmentation] : normal skin color and pigmentation [Petechial Hemorrhages (___cm)] : no petechial hemorrhages [No Venous Stasis] : no venous stasis [] : no rash [No Xanthoma] : no  xanthoma was observed [Skin Lesions] : no skin lesions [No Skin Ulcers] : no skin ulcer [Oriented To Time, Place, And Person] : oriented to person, place, and time [Affect] : the affect was normal [Mood] : the mood was normal [No Anxiety] : not feeling anxious

## 2020-08-11 NOTE — HISTORY OF PRESENT ILLNESS
[FreeTextEntry1] : atrial tachycardia. , dyspnea on exertion and COPD , smoking addiction\par \par HPI for today: patient admitted to hospital on 1 aug 2020 for confusiona and shortness of breathe and altered .  he had hiccups, he was hypnatremic and he had infection  and fever. he recocvered.  cardiologuy was consutlted for tachycardia and atrial tachyarrhythmias.\par It mentions on the discharge that he had atrial fibrillation. he is on cardizem but not anticoagulation \par no headacehs. no dizziness\par Ct scan reviewed and shows calcification of LAD.\par he is a smoker. Smoked 1 pack a day >40 years. (atat age 16 he started)  \par + numbness of the legs blateall,. and pain in the legs bilaterally.  \par \par \par \par 70 y/o M with PMHX 3.4cm L Lung Mass (known, refusing workup/surgical eval/biopsy), Hx Intracranial Tumor s/p L Frontal Craniotomy, Seizure Disorder, COPD/Emphysema, Tobacco Abuse/Active Smoker was brought to [facility] by his wife for evaluation of Hiccups x1 week, AMS, and Fever and was subsequently BIBEMS to [facility] ER for further evaluation. Hypoxia noted by EMS with O2sat 89% RA at rest improving to 99% on 6L NC. Fever Tmax 103.4F noted in Triage. +Associated Chills. +Nonproductive Cough/No sputum. Also c/o acute on chronic LE pain (hx LE trauma as a child). +Fatigue, +Gen Weakness \par \par .Pt noted to have significant hyponatremia on labs with Na 122. CXR  Hyperinflated with RLL infiltrates. CTA Chest with emphysema, 3.4cm Left Lung Apical Mass, multiple other scar-like densities, unchanged from prior imaging. Negative for PE. +new GGO RLL and +R Hilar LAD \par  LE Dopplers negative for DVT (LE pain is chronic per patient). CT head for AMS shows post-surgical changes from L Craniotomy otherwise no acute findings. \par \par \par \par

## 2020-08-14 ENCOUNTER — APPOINTMENT (OUTPATIENT)
Dept: NEUROLOGY | Facility: CLINIC | Age: 70
End: 2020-08-14
Payer: MEDICARE

## 2020-08-14 VITALS
DIASTOLIC BLOOD PRESSURE: 70 MMHG | SYSTOLIC BLOOD PRESSURE: 126 MMHG | HEIGHT: 73 IN | WEIGHT: 138 LBS | BODY MASS INDEX: 18.29 KG/M2 | TEMPERATURE: 97.7 F

## 2020-08-14 PROCEDURE — 99213 OFFICE O/P EST LOW 20 MIN: CPT

## 2020-08-14 RX ORDER — ALBUTEROL SULFATE 90 UG/1
108 (90 BASE) INHALANT RESPIRATORY (INHALATION)
Qty: 8 | Refills: 0 | Status: COMPLETED | COMMUNITY
Start: 2020-08-05

## 2020-08-14 RX ORDER — LEVOFLOXACIN 750 MG/1
750 TABLET, FILM COATED ORAL
Qty: 3 | Refills: 0 | Status: COMPLETED | COMMUNITY
Start: 2020-08-05

## 2020-08-14 RX ORDER — DILTIAZEM HYDROCHLORIDE 120 MG/1
120 CAPSULE, EXTENDED RELEASE ORAL
Qty: 30 | Refills: 0 | Status: COMPLETED | COMMUNITY
Start: 2020-08-05

## 2020-08-14 NOTE — REVIEW OF SYSTEMS
[Anxiety] : anxiety [Feeling Tired] : feeling tired [As Noted in HPI] : as noted in HPI [Chest Pain] : chest pain [Palpitations] : palpitations [Joint Pain] : joint pain [Negative] : Endocrine

## 2020-08-14 NOTE — ASSESSMENT
[FreeTextEntry1] : This is a 69 year-old man with a history of brain tumor resection as a child.\par \par He remains on Dilantin and phenobarbital. \par I will continue his current doses.\par \par He follows with the cardiologist.\par \par I will see him back in 6 months.

## 2020-08-14 NOTE — PHYSICAL EXAM
[General Appearance - Alert] : alert [Oriented To Time, Place, And Person] : oriented to person, place, and time [General Appearance - In No Acute Distress] : in no acute distress [Affect] : the affect was normal [Cranial Nerves Optic (II)] : visual acuity intact bilaterally,  visual fields full to confrontation, pupils equal round and reactive to light [Cranial Nerves Facial (VII)] : face symmetrical [Cranial Nerves Oculomotor (III)] : extraocular motion intact [Cranial Nerves Trigeminal (V)] : facial sensation intact symmetrically [Cranial Nerves Glossopharyngeal (IX)] : tongue and palate midline [Cranial Nerves Vestibulocochlear (VIII)] : hearing was intact bilaterally [Cranial Nerves Accessory (XI - Cranial And Spinal)] : head turning and shoulder shrug symmetric [Motor Strength] : muscle strength was normal in all four extremities [Motor Tone] : muscle tone was normal in all four extremities [Cranial Nerves Hypoglossal (XII)] : there was no tongue deviation with protrusion [Sensation Tactile Decrease] : light touch was intact [Sensation Pain / Temperature Decrease] : pain and temperature was intact [Romberg's Sign] : a positive Romberg's sign was present [Sensation Vibration Decrease] : vibration was intact [2+] : Patella right 2+ [PERRL With Normal Accommodation] : pupils were equal in size, round, reactive to light, with normal accommodation [Involuntary Movements] : no involuntary movements were seen [Optic Disc Abnormality] : the optic disc were normal in size and color [Edema] : there was no peripheral edema [FreeTextEntry1] : He has some difficulty with short-term memory. Remote memory is better preserved the [Dysarthria] : no dysarthria [Plantar Reflex Right Only] : normal on the right [Aphasia] : no dysphasia/aphasia [Coordination - Dysmetria Impaired Finger-to-Nose Bilateral] : not present [FreeTextEntry8] : The patient's balance is poor and he uses a cane for support. [Plantar Reflex Left Only] : normal on the left

## 2020-08-14 NOTE — CONSULT LETTER
[Dear  ___] : Dear  [unfilled], [Consult Closing:] : Thank you very much for allowing me to participate in the care of this patient.  If you have any questions, please do not hesitate to contact me. [Sincerely,] : Sincerely, [Courtesy Letter:] : I had the pleasure of seeing your patient, [unfilled], in my office today. [FreeTextEntry3] : Kyle Bonilla MD.  anxiety disorder

## 2020-08-14 NOTE — DATA REVIEWED
[de-identified] : CT scan of the head performed on 10/1/16 was reviewed. This study demonstrated only chronic postoperative changes graded there was no enhancement to suggest recurrent tumor.

## 2020-08-14 NOTE — HISTORY OF PRESENT ILLNESS
[FreeTextEntry1] : I saw this patient in the office today.\par \par As you recall, he described a history of brain tumor resection as a child. He has been on antiseizure medications ever since then.\par He is currently on Dilantin 200 mg twice per day, and phenobarbital 64.8 mg twice per day. He takes folic acid supplementation.\par \par He is somewhat unclear as to when his last seizure was.\par The patient does not drive.\par He had seen a neurologist previously but reported that he had to switch doctors due to the fact that this previous neurologist did not take his insurance.\par \par He is now status post hospitalization for heart attack.

## 2020-09-01 ENCOUNTER — APPOINTMENT (OUTPATIENT)
Dept: FAMILY MEDICINE | Facility: CLINIC | Age: 70
End: 2020-09-01
Payer: MEDICARE

## 2020-09-01 VITALS
WEIGHT: 152 LBS | SYSTOLIC BLOOD PRESSURE: 124 MMHG | HEART RATE: 87 BPM | BODY MASS INDEX: 20.15 KG/M2 | DIASTOLIC BLOOD PRESSURE: 80 MMHG | TEMPERATURE: 98.2 F | OXYGEN SATURATION: 98 % | HEIGHT: 73 IN

## 2020-09-01 DIAGNOSIS — Z23 ENCOUNTER FOR IMMUNIZATION: ICD-10-CM

## 2020-09-01 DIAGNOSIS — Z09 ENCOUNTER FOR FOLLOW-UP EXAMINATION AFTER COMPLETED TREATMENT FOR CONDITIONS OTHER THAN MALIGNANT NEOPLASM: ICD-10-CM

## 2020-09-01 PROCEDURE — 90662 IIV NO PRSV INCREASED AG IM: CPT

## 2020-09-01 PROCEDURE — G0008: CPT

## 2020-09-01 PROCEDURE — 99213 OFFICE O/P EST LOW 20 MIN: CPT | Mod: 25

## 2020-09-01 NOTE — HISTORY OF PRESENT ILLNESS
[Post-hospitalization from ___ Hospital] : Post-hospitalization from [unfilled] Hospital [Admitted on: ___] : The patient was admitted on [unfilled] [Discharged on ___] : discharged on [unfilled] [FreeTextEntry2] : 70 yo male with pmhx of CAD (last MI at 43 yo), lung mass (3.4 cm, pt has refused workup in past), seizure disorder (on dilantin and phenobarb since childhood), smoker (quit last month after 50 yrs 1ppd), hyponatremia, atrial tachycardia presents after recent hospitalization Aug 1 to Aug 5, 2020 for fever, ams and low Na admitted for pneumonia, copd and lung mass. Pt was given abx, supplemental O2 and he recovered. Has no acute complaints today. Denies fever, chills, cp, palpitations, sob, n/v, calf pain or wt loss.

## 2020-09-01 NOTE — PLAN
[FreeTextEntry1] : Community acquired pneumonia - pt reports completion of antibiotic regimen, currently asymptomatic, PE wnl, f/u pulmonology\par Lung mass: 3.4 cm mass in lung, pt had previously refused workup however is now amenable to seeing oncologist. Was referred to [facility] center at [facility]. pt agrees to make appointment. F/u pulmonology. \par COPD: on albuterol/tiotropium neb, has hfa rescue inhaler unsure of name, f/u pulmonology\par Hyponatremia: low sodium at hospital likely 2/2 SIADH 2/2 lung mass vs covid19, f/u CMP\par Macrocytosis: likely 2/2 phenytoin, no current alcohol use, will f/u repeat cbc\par Atrial tachycardia: pt has external monitor, follow up with Dr. Sun\par CAD: hx of MI, c/w asa, start atorvastatin 20 mg po qdaily (discussed with cardiologist Dr. Sun), ascvd 15.7%\par Seizure disorder: c/w phenobarbital and phenytoin, f/u neurology in 2/2021

## 2020-09-01 NOTE — COUNSELING
[Risk of tobacco use and health benefits of smoking cessation discussed] : Risk of tobacco use and health benefits of smoking cessation discussed [Cessation strategies including cessation program discussed] : Cessation strategies including cessation program discussed [Use of nicotine replacement therapies and other medications discussed] : Use of nicotine replacement therapies and other medications discussed [AUDIT-C Screening administered and reviewed] : AUDIT-C Screening administered and reviewed [Patient motivation] : Patient motivation

## 2020-09-01 NOTE — HEALTH RISK ASSESSMENT
[] : Yes [30 or more] : 30 or more [1 or 2 (0 pts)] : 1 or 2 (0 points) [Never (0 pts)] : Never (0 points) [No] : In the past 12 months have you used drugs other than those required for medical reasons? No [No falls in past year] : Patient reported no falls in the past year [0] : 2) Feeling down, depressed, or hopeless: Not at all (0) [HIV test declined] : HIV test declined [Hepatitis C test declined] : Hepatitis C test declined [Behavioral] : behavioral [With Family] : lives with family [Retired] : retired [] :  [Sexually Active] : sexually active [Feels Safe at Home] : Feels safe at home [Fully functional (bathing, dressing, toileting, transferring, walking, feeding)] : Fully functional (bathing, dressing, toileting, transferring, walking, feeding) [Fully functional (using the telephone, shopping, preparing meals, housekeeping, doing laundry, using] : Fully functional and needs no help or supervision to perform IADLs (using the telephone, shopping, preparing meals, housekeeping, doing laundry, using transportation, managing medications and managing finances) [de-identified] : quit Aug 2020, 50 yrs at 1ppd prior [[id number]] : 0 [High Risk Behavior] : no high risk behavior [Reports changes in hearing] : Reports no changes in hearing [Reports changes in vision] : Reports no changes in vision [Reports changes in dental health] : Reports no changes in dental health [BoneDensityComments] : P [ColonoscopyComments] : Pt refusing colonoscopy

## 2020-09-03 LAB
ALBUMIN SERPL ELPH-MCNC: 4.3 G/DL
ALP BLD-CCNC: 143 U/L
ALT SERPL-CCNC: 17 U/L
ANION GAP SERPL CALC-SCNC: 15 MMOL/L
APO B SERPL-MCNC: 84 MG/DL
APO LP(A) SERPL-MCNC: 286.2 NMOL/L
AST SERPL-CCNC: 22 U/L
BASOPHILS # BLD AUTO: 0.04 K/UL
BASOPHILS NFR BLD AUTO: 0.6 %
BILIRUB SERPL-MCNC: <0.2 MG/DL
BUN SERPL-MCNC: 33 MG/DL
CALCIUM SERPL-MCNC: 9.4 MG/DL
CHLORIDE SERPL-SCNC: 104 MMOL/L
CHOLEST SERPL-MCNC: 217 MG/DL
CHOLEST/HDLC SERPL: 2.2 RATIO
CO2 SERPL-SCNC: 20 MMOL/L
CREAT SERPL-MCNC: 1 MG/DL
EOSINOPHIL # BLD AUTO: 0.17 K/UL
EOSINOPHIL NFR BLD AUTO: 2.6 %
GLUCOSE SERPL-MCNC: 115 MG/DL
HCT VFR BLD CALC: 36.1 %
HDLC SERPL-MCNC: 101 MG/DL
HGB BLD-MCNC: 11.5 G/DL
IMM GRANULOCYTES NFR BLD AUTO: 0.6 %
LDLC SERPL CALC-MCNC: 86 MG/DL
LYMPHOCYTES # BLD AUTO: 2.03 K/UL
LYMPHOCYTES NFR BLD AUTO: 30.5 %
MAN DIFF?: NORMAL
MCHC RBC-ENTMCNC: 31.9 GM/DL
MCHC RBC-ENTMCNC: 36.1 PG
MCV RBC AUTO: 113.2 FL
MONOCYTES # BLD AUTO: 0.64 K/UL
MONOCYTES NFR BLD AUTO: 9.6 %
NEUTROPHILS # BLD AUTO: 3.74 K/UL
NEUTROPHILS NFR BLD AUTO: 56.1 %
PLATELET # BLD AUTO: 266 K/UL
POTASSIUM SERPL-SCNC: 5 MMOL/L
PROT SERPL-MCNC: 6.7 G/DL
RBC # BLD: 3.19 M/UL
RBC # FLD: 15.5 %
SODIUM SERPL-SCNC: 139 MMOL/L
TRIGL SERPL-MCNC: 149 MG/DL
WBC # FLD AUTO: 6.66 K/UL

## 2020-09-15 ENCOUNTER — APPOINTMENT (OUTPATIENT)
Dept: CARDIOLOGY | Facility: CLINIC | Age: 70
End: 2020-09-15
Payer: MEDICARE

## 2020-09-15 PROCEDURE — 93978 VASCULAR STUDY: CPT

## 2020-09-15 PROCEDURE — 93923 UPR/LXTR ART STDY 3+ LVLS: CPT

## 2020-09-17 RX ORDER — ATORVASTATIN CALCIUM 40 MG/1
40 TABLET, FILM COATED ORAL
Qty: 90 | Refills: 2 | Status: DISCONTINUED | COMMUNITY
Start: 2020-09-01 | End: 2020-09-17

## 2020-09-18 RX ORDER — ROSUVASTATIN CALCIUM 10 MG/1
10 TABLET, FILM COATED ORAL
Qty: 90 | Refills: 2 | Status: DISCONTINUED | COMMUNITY
Start: 2020-09-17 | End: 2020-09-18

## 2020-09-21 ENCOUNTER — APPOINTMENT (OUTPATIENT)
Dept: CARDIOLOGY | Facility: CLINIC | Age: 70
End: 2020-09-21
Payer: MEDICARE

## 2020-09-21 PROCEDURE — 93015 CV STRESS TEST SUPVJ I&R: CPT

## 2020-09-21 PROCEDURE — 78452 HT MUSCLE IMAGE SPECT MULT: CPT

## 2020-09-21 PROCEDURE — A9500: CPT

## 2020-09-21 PROCEDURE — 93925 LOWER EXTREMITY STUDY: CPT

## 2020-10-15 ENCOUNTER — APPOINTMENT (OUTPATIENT)
Dept: FAMILY MEDICINE | Facility: CLINIC | Age: 70
End: 2020-10-15
Payer: MEDICARE

## 2020-10-15 ENCOUNTER — LABORATORY RESULT (OUTPATIENT)
Age: 70
End: 2020-10-15

## 2020-10-15 VITALS
HEIGHT: 73 IN | SYSTOLIC BLOOD PRESSURE: 134 MMHG | TEMPERATURE: 97.9 F | HEART RATE: 88 BPM | DIASTOLIC BLOOD PRESSURE: 80 MMHG | WEIGHT: 156 LBS | OXYGEN SATURATION: 95 % | BODY MASS INDEX: 20.67 KG/M2

## 2020-10-15 DIAGNOSIS — Z12.11 ENCOUNTER FOR SCREENING FOR MALIGNANT NEOPLASM OF COLON: ICD-10-CM

## 2020-10-15 PROCEDURE — 99213 OFFICE O/P EST LOW 20 MIN: CPT

## 2020-10-15 NOTE — ASSESSMENT
[FreeTextEntry1] : Macrocytic anemia: currently asymptomatic, f/u repeat cbc, iron studies, b12, folate, and fobt\par Lung mass: 3.4 cm mass in left lung apex, pt had initially refused workup however is now amenable to seeing oncologist. Refer to [facility]\par Screening for colon ca: refer to GI\par

## 2020-10-15 NOTE — HISTORY OF PRESENT ILLNESS
[FreeTextEntry1] : follow up  [de-identified] : 68 yo male presents for follow up. Pt is interested in screening colonoscopy. Has no acute complaints. Denies fever, chills, cp, palpitations, sob, n/v, heat/cold intolerance, wt loss, melena, hematochezia, change in stool caliber, fatigue or calf pain.

## 2020-10-20 LAB
ALBUMIN SERPL ELPH-MCNC: 4.4 G/DL
ALP BLD-CCNC: 194 U/L
ALT SERPL-CCNC: 17 U/L
ANION GAP SERPL CALC-SCNC: 14 MMOL/L
AST SERPL-CCNC: 21 U/L
BASOPHILS # BLD AUTO: 0.04 K/UL
BASOPHILS NFR BLD AUTO: 0.6 %
BILIRUB SERPL-MCNC: 0.2 MG/DL
BUN SERPL-MCNC: 12 MG/DL
CALCIUM SERPL-MCNC: 9.9 MG/DL
CHLORIDE SERPL-SCNC: 98 MMOL/L
CO2 SERPL-SCNC: 25 MMOL/L
CREAT SERPL-MCNC: 0.97 MG/DL
EOSINOPHIL # BLD AUTO: 0.27 K/UL
EOSINOPHIL NFR BLD AUTO: 4.1 %
FERRITIN SERPL-MCNC: 51 NG/ML
FOLATE SERPL-MCNC: >20 NG/ML
GLUCOSE SERPL-MCNC: 101 MG/DL
HCT VFR BLD CALC: 41.2 %
HGB BLD-MCNC: 13.1 G/DL
IMM GRANULOCYTES NFR BLD AUTO: 0.3 %
IRON SATN MFR SERPL: 19 %
IRON SERPL-MCNC: 47 UG/DL
LYMPHOCYTES # BLD AUTO: 1.41 K/UL
LYMPHOCYTES NFR BLD AUTO: 21.3 %
MAN DIFF?: NORMAL
MCHC RBC-ENTMCNC: 31.8 GM/DL
MCHC RBC-ENTMCNC: 35 PG
MCV RBC AUTO: 110.2 FL
MONOCYTES # BLD AUTO: 0.89 K/UL
MONOCYTES NFR BLD AUTO: 13.5 %
NEUTROPHILS # BLD AUTO: 3.98 K/UL
NEUTROPHILS NFR BLD AUTO: 60.2 %
PLATELET # BLD AUTO: 328 K/UL
POTASSIUM SERPL-SCNC: 4.4 MMOL/L
PROT SERPL-MCNC: 7.6 G/DL
RBC # BLD: 3.74 M/UL
RBC # FLD: 13.5 %
SODIUM SERPL-SCNC: 136 MMOL/L
TIBC SERPL-MCNC: 253 UG/DL
TRANSFERRIN SERPL-MCNC: 206 MG/DL
UIBC SERPL-MCNC: 205 UG/DL
VIT B12 SERPL-MCNC: 624 PG/ML
WBC # FLD AUTO: 6.61 K/UL

## 2020-10-23 ENCOUNTER — LABORATORY RESULT (OUTPATIENT)
Age: 70
End: 2020-10-23

## 2020-11-10 ENCOUNTER — RX RENEWAL (OUTPATIENT)
Age: 70
End: 2020-11-10

## 2020-11-17 ENCOUNTER — NON-APPOINTMENT (OUTPATIENT)
Age: 70
End: 2020-11-17

## 2020-11-17 ENCOUNTER — APPOINTMENT (OUTPATIENT)
Dept: CARDIOLOGY | Facility: CLINIC | Age: 70
End: 2020-11-17
Payer: MEDICARE

## 2020-11-17 VITALS
DIASTOLIC BLOOD PRESSURE: 80 MMHG | HEIGHT: 73 IN | OXYGEN SATURATION: 94 % | TEMPERATURE: 98.2 F | BODY MASS INDEX: 20.67 KG/M2 | WEIGHT: 156 LBS | SYSTOLIC BLOOD PRESSURE: 124 MMHG | HEART RATE: 65 BPM

## 2020-11-17 PROCEDURE — 99072 ADDL SUPL MATRL&STAF TM PHE: CPT

## 2020-11-17 PROCEDURE — 99215 OFFICE O/P EST HI 40 MIN: CPT

## 2020-11-17 PROCEDURE — 93000 ELECTROCARDIOGRAM COMPLETE: CPT

## 2020-11-17 NOTE — CARDIOLOGY SUMMARY
[___] : [unfilled] [LVEF ___%] : LVEF [unfilled]% [Normal] : normal LA size [None] : no mitral regurgitation [de-identified] : sept 2020: 1 mths :  PSVT. no definite atrial fibrillation  [de-identified] : MEREDITH : sept 2020 NOrmal Study at rest and provocation. \par \par US arterial Duplex: 9/2020:" severe athersclerosis .  20-49 % CFA on left side. \par US arota: MOderate athersclerosis no aneurysm. no stenosis.

## 2020-11-17 NOTE — HISTORY OF PRESENT ILLNESS
[FreeTextEntry1] : atrial tachycardia. , dyspnea on exertion and COPD , smoking addiction\par \par HPI for today:  : he feels ok. he has not been taking cardizem.   he denies new symptoms. intermitten palpitaitons.  quit mokimng.   he feels dizziness. and klightheadness.  no syncope.\par \par \par \par old note:  patient admitted to hospital on 1 aug 2020 for confusiona and shortness of breathe and altered .  he had hiccups, he was hypnatremic and he had infection  and fever. he recocvered.  cardiologuy was consutlted for tachycardia and atrial tachyarrhythmias.\par It mentions on the discharge that he had atrial fibrillation. he is on cardizem but not anticoagulation \par no headacehs. no dizziness\par Ct scan reviewed and shows calcification of LAD.\par he is a smoker. Smoked 1 pack a day >40 years. (atat age 16 he started)  \par + numbness of the legs blateall,. and pain in the legs bilaterally.  \par \par \par \par 68 y/o M with PMHX 3.4cm L Lung Mass (known, refusing workup/surgical eval/biopsy), Hx Intracranial Tumor s/p L Frontal Craniotomy, Seizure Disorder, COPD/Emphysema, Tobacco Abuse/Active Smoker was brought to [facility] by his wife for evaluation of Hiccups x1 week, AMS, and Fever and was subsequently BIBEMS to [facility] ER for further evaluation. Hypoxia noted by EMS with O2sat 89% RA at rest improving to 99% on 6L NC. Fever Tmax 103.4F noted in Triage. +Associated Chills. +Nonproductive Cough/No sputum. Also c/o acute on chronic LE pain (hx LE trauma as a child). +Fatigue, +Gen Weakness \par \par .Pt noted to have significant hyponatremia on labs with Na 122. CXR  Hyperinflated with RLL infiltrates. CTA Chest with emphysema, 3.4cm Left Lung Apical Mass, multiple other scar-like densities, unchanged from prior imaging. Negative for PE. +new GGO RLL and +R Hilar LAD \par  LE Dopplers negative for DVT (LE pain is chronic per patient). CT head for AMS shows post-surgical changes from L Craniotomy otherwise no acute findings. \par \par \par \par

## 2020-11-17 NOTE — PHYSICAL EXAM
[General Appearance - Well Developed] : well developed [Normal Appearance] : normal appearance [Well Groomed] : well groomed [General Appearance - Well Nourished] : well nourished [No Deformities] : no deformities [General Appearance - In No Acute Distress] : no acute distress [Normal Conjunctiva] : the conjunctiva exhibited no abnormalities [Eyelids - No Xanthelasma] : the eyelids demonstrated no xanthelasmas [Normal Oral Mucosa] : normal oral mucosa [No Oral Pallor] : no oral pallor [No Oral Cyanosis] : no oral cyanosis [Normal Jugular Venous A Waves Present] : normal jugular venous A waves present [Normal Jugular Venous V Waves Present] : normal jugular venous V waves present [No Jugular Venous Gaitan A Waves] : no jugular venous gaitan A waves [Respiration, Rhythm And Depth] : normal respiratory rhythm and effort [Exaggerated Use Of Accessory Muscles For Inspiration] : no accessory muscle use [Auscultation Breath Sounds / Voice Sounds] : lungs were clear to auscultation bilaterally [Heart Rate And Rhythm] : heart rate and rhythm were normal [Heart Sounds] : normal S1 and S2 [Murmurs] : no murmurs present [Abdomen Soft] : soft [Abdomen Tenderness] : non-tender [Abdomen Mass (___ Cm)] : no abdominal mass palpated [Abnormal Walk] : normal gait [Gait - Sufficient For Exercise Testing] : the gait was sufficient for exercise testing [Nail Clubbing] : no clubbing of the fingernails [Cyanosis, Localized] : no localized cyanosis [Petechial Hemorrhages (___cm)] : no petechial hemorrhages [Skin Color & Pigmentation] : normal skin color and pigmentation [] : no rash [No Venous Stasis] : no venous stasis [Skin Lesions] : no skin lesions [No Skin Ulcers] : no skin ulcer [No Xanthoma] : no  xanthoma was observed [Oriented To Time, Place, And Person] : oriented to person, place, and time [Affect] : the affect was normal [Mood] : the mood was normal [No Anxiety] : not feeling anxious

## 2020-11-17 NOTE — DISCUSSION/SUMMARY
[Patient] : the patient [Risks] : risks [Benefits] : benefits [Alternatives] : alternatives [___ Month(s)] : [unfilled] month(s) [With Me] : with me [FreeTextEntry1] : This is a 69 year old male with hsitory ofsmoking , COPD emphysema, with atrial tachya rrhythmia with coronary calcifications and dyspnea on exertion \par 1) dyspnea on exertion and coronary caclfications : negative stress test,. \par 2) atrial tachyarrhyhtmia:  Likley atrial tachcyardia. due to COPD. not on  anticoagulation 4 weeks event monitor. : negative for atrial fibrillation . initiate cardizem 30 mg Q12 ( as pts heart rate 60-70). no anticoagulation . repeat 4 week event monitor in 1 year. \par 5) AAA screening:  significant athersclerosis. no aneurysm. .  > 40 pack year history of smoking. \par 6) severe athersclersois: Peripheral vascular disease : aspirin statins. \par 7) Smoking cessation counselling addressed.  patietn quit somkign now. \par 8) dizziness and prior  transient ischemic attack : carotid US. \par \par

## 2020-11-19 ENCOUNTER — APPOINTMENT (OUTPATIENT)
Dept: CARDIOLOGY | Facility: CLINIC | Age: 70
End: 2020-11-19
Payer: MEDICARE

## 2020-11-19 PROCEDURE — 93880 EXTRACRANIAL BILAT STUDY: CPT

## 2020-12-15 ENCOUNTER — APPOINTMENT (OUTPATIENT)
Dept: FAMILY MEDICINE | Facility: CLINIC | Age: 70
End: 2020-12-15
Payer: MEDICARE

## 2020-12-15 VITALS
WEIGHT: 165 LBS | DIASTOLIC BLOOD PRESSURE: 82 MMHG | BODY MASS INDEX: 21.87 KG/M2 | OXYGEN SATURATION: 96 % | HEART RATE: 67 BPM | TEMPERATURE: 97.3 F | SYSTOLIC BLOOD PRESSURE: 120 MMHG | HEIGHT: 73 IN

## 2020-12-15 DIAGNOSIS — Z86.2 PERSONAL HISTORY OF DISEASES OF THE BLOOD AND BLOOD-FORMING ORGANS AND CERTAIN DISORDERS INVOLVING THE IMMUNE MECHANISM: ICD-10-CM

## 2020-12-15 PROCEDURE — 99072 ADDL SUPL MATRL&STAF TM PHE: CPT

## 2020-12-15 PROCEDURE — 99214 OFFICE O/P EST MOD 30 MIN: CPT

## 2020-12-15 NOTE — HISTORY OF PRESENT ILLNESS
[FreeTextEntry8] : 69 yo male with pmhx of lung mass presents for follow up. Pt has no acute complaints. Denies fever, chills, cp, palpitations, sob, n/v, heat/cold intolerance, wt loss, or calf pain.

## 2020-12-15 NOTE — ASSESSMENT
[FreeTextEntry1] : Lung mass: 3.4 cm mass in left lung apex, pt had initially refused workup however is now amenable to seeing oncologist however did not make appt, will encourage pt to make appt with [facility]\par Macrocytosis without anemia: b12/folate wnl, no alcohol use, likely 2/2 phenobarbital/phenytoin, f/u heme/onc\par Seizure disorder: c/w current regimen, f/u neurology\par Elevated ALP: currently asymptomatic, pt follows with GI, refer to GI\par RTC 4 - 6 wks\par

## 2021-02-17 ENCOUNTER — APPOINTMENT (OUTPATIENT)
Dept: NEUROLOGY | Facility: CLINIC | Age: 71
End: 2021-02-17
Payer: MEDICARE

## 2021-02-17 VITALS
SYSTOLIC BLOOD PRESSURE: 140 MMHG | WEIGHT: 178 LBS | DIASTOLIC BLOOD PRESSURE: 80 MMHG | HEIGHT: 73 IN | BODY MASS INDEX: 23.59 KG/M2

## 2021-02-17 PROCEDURE — 99213 OFFICE O/P EST LOW 20 MIN: CPT

## 2021-02-17 PROCEDURE — 99072 ADDL SUPL MATRL&STAF TM PHE: CPT

## 2021-02-17 NOTE — DATA REVIEWED
[de-identified] : CT scan of the head performed on 10/1/16 was reviewed. This study demonstrated only chronic postoperative changes graded there was no enhancement to suggest recurrent tumor.

## 2021-02-17 NOTE — PHYSICAL EXAM
[General Appearance - Alert] : alert [General Appearance - In No Acute Distress] : in no acute distress [Oriented To Time, Place, And Person] : oriented to person, place, and time [Affect] : the affect was normal [Cranial Nerves Optic (II)] : visual acuity intact bilaterally,  visual fields full to confrontation, pupils equal round and reactive to light [Cranial Nerves Oculomotor (III)] : extraocular motion intact [Cranial Nerves Trigeminal (V)] : facial sensation intact symmetrically [Cranial Nerves Facial (VII)] : face symmetrical [Cranial Nerves Vestibulocochlear (VIII)] : hearing was intact bilaterally [Cranial Nerves Glossopharyngeal (IX)] : tongue and palate midline [Cranial Nerves Accessory (XI - Cranial And Spinal)] : head turning and shoulder shrug symmetric [Motor Tone] : muscle tone was normal in all four extremities [Cranial Nerves Hypoglossal (XII)] : there was no tongue deviation with protrusion [Motor Strength] : muscle strength was normal in all four extremities [Sensation Tactile Decrease] : light touch was intact [Sensation Pain / Temperature Decrease] : pain and temperature was intact [Sensation Vibration Decrease] : vibration was intact [Romberg's Sign] : a positive Romberg's sign was present [2+] : Patella left 2+ [PERRL With Normal Accommodation] : pupils were equal in size, round, reactive to light, with normal accommodation [Optic Disc Abnormality] : the optic disc were normal in size and color [Edema] : there was no peripheral edema [Involuntary Movements] : no involuntary movements were seen [Limited Balance] : the patient's balance was impaired [FreeTextEntry1] : He has some difficulty with short-term memory. Remote memory is better preserved the [Dysarthria] : no dysarthria [Aphasia] : no dysphasia/aphasia [Coordination - Dysmetria Impaired Finger-to-Nose Bilateral] : not present [Plantar Reflex Right Only] : normal on the right [Plantar Reflex Left Only] : normal on the left [FreeTextEntry8] : The patient's balance is poor and he uses a cane for support.

## 2021-02-17 NOTE — ASSESSMENT
[FreeTextEntry1] : This is a 70 year-old man with a history of brain tumor resection as a child.\par \par He remains on Dilantin and phenobarbital. \par I will continue his current doses.\par \par He follows with the cardiologist.\par \par I will see him back in 6 months.

## 2021-03-18 ENCOUNTER — RX RENEWAL (OUTPATIENT)
Age: 71
End: 2021-03-18

## 2021-03-19 RX ORDER — MULTIVITAMIN
TABLET ORAL DAILY
Refills: 0 | Status: ACTIVE | COMMUNITY

## 2021-03-23 ENCOUNTER — APPOINTMENT (OUTPATIENT)
Dept: CARDIOLOGY | Facility: CLINIC | Age: 71
End: 2021-03-23

## 2021-04-07 ENCOUNTER — APPOINTMENT (OUTPATIENT)
Dept: PULMONOLOGY | Facility: CLINIC | Age: 71
End: 2021-04-07
Payer: MEDICARE

## 2021-04-07 VITALS
HEART RATE: 68 BPM | OXYGEN SATURATION: 95 % | BODY MASS INDEX: 27.2 KG/M2 | WEIGHT: 190 LBS | SYSTOLIC BLOOD PRESSURE: 130 MMHG | RESPIRATION RATE: 16 BRPM | DIASTOLIC BLOOD PRESSURE: 60 MMHG | HEIGHT: 70 IN

## 2021-04-07 DIAGNOSIS — Z87.01 PERSONAL HISTORY OF PNEUMONIA (RECURRENT): ICD-10-CM

## 2021-04-07 PROCEDURE — 99072 ADDL SUPL MATRL&STAF TM PHE: CPT

## 2021-04-07 PROCEDURE — 99205 OFFICE O/P NEW HI 60 MIN: CPT

## 2021-04-07 RX ORDER — DILTIAZEM HYDROCHLORIDE 120 MG/1
120 CAPSULE, EXTENDED RELEASE ORAL DAILY
Qty: 90 | Refills: 0 | Status: DISCONTINUED | COMMUNITY
End: 2021-04-07

## 2021-04-07 NOTE — CONSULT LETTER
[Dear  ___] : Dear  [unfilled], [Consult Letter:] : I had the pleasure of evaluating your patient, [unfilled]. [Please see my note below.] : Please see my note below. [Consult Closing:] : Thank you very much for allowing me to participate in the care of this patient.  If you have any questions, please do not hesitate to contact me. [Sincerely,] : Sincerely, [FreeTextEntry3] : Jeffery Spears MD, FCCP, D. ABSM\par Pulmonary and Sleep Medicine\par [facility]monary [facility] at [place]

## 2021-04-07 NOTE — DISCUSSION/SUMMARY
[FreeTextEntry1] : \par #1. Will schedule PFTs in near future to assess lung function with Covid testing prior to PFTs\par #2. The patient does not appear to require chronic BD therapy at this time\par #3. Diet and exercise for weight loss\par #4. SOBOE is likely related to weight or deconditioning \par #5. PET CT to evaluate RUL mass and BRANDO and re-evaluate RLL infiltrate (? pneumonia)\par #6. F/u with Dr. Mathur for possible bx\par #7. Albuterol nebs/inhaler therapy as needed for now\par #8. Pt s/p 2 ppd smoking hx for 55 years, quit 8/2020\par #9. F/u next week with PFTs and PET CT\par #10. S/p both Covid vaccines\par #11. Reviewed risks of exposure and symptoms of Covid-19 virus, including how the virus is spread and precautions to avoid ran virus.\par \par Patient's questions were answered and patient appears to understand these recommendations \par Discussed above with patient and wife who was also present.

## 2021-04-07 NOTE — REVIEW OF SYSTEMS
[SOB on Exertion] : sob on exertion [Back Pain] : back pain [Seizures] : seizures [Fever] : no fever [Chills] : no chills [Nasal Congestion] : no nasal congestion [Postnasal Drip] : no postnasal drip [Sinus Problems] : no sinus problems [Cough] : no cough [Chest Tightness] : no chest tightness [Sputum] : no sputum [Dyspnea] : no dyspnea [Pleuritic Pain] : no pleuritic pain [Wheezing] : no wheezing [Chest Discomfort] : no chest discomfort [Edema] : no edema [Palpitations] : no palpitations [Syncope] : no syncope [Hay Fever] : no hay fever [Seasonal Allergies] : no seasonal allergies [GERD] : no gerd [Abdominal Pain] : no abdominal pain [Nausea] : no nausea [Vomiting] : no vomiting [Diarrhea] : no diarrhea [Constipation] : no constipation [Anemia] : no anemia [Headache] : no headache [Dizziness] : no dizziness [Numbness] : no numbness [Paralysis] : no paralysis [Confusion] : no confusion [Depression] : no depression [Anxiety] : no anxiety [Diabetes] : no diabetes [Thyroid Problem] : no thyroid problem

## 2021-04-07 NOTE — REASON FOR VISIT
[Initial] : an initial visit [Abnormal CXR/ Chest CT] : an abnormal CXR/ chest CT [COPD] : COPD [Shortness of Breath] : shortness of breath [TextBox_44] : lung mass, former smoker

## 2021-04-07 NOTE — HISTORY OF PRESENT ILLNESS
[Former] : former [Initial Eval - Existing Diagnosis] : an initial evaluation of an existing diagnosis of [Dyspnea on Exertion] : dyspnea on exertion [Currently Experiencing] : The patient is currently experiencing symptoms. [On ___] : performed on [unfilled] [Patient] : the patient [Indication ___] : for an indication of [unfilled] [None] : no new symptoms reported [TextBox_4] : Patient c/o SOBOE but is otherwise without associated respiratory complaints. \par Pt is former smoker of 2 ppd x 55 years, quit 8/2020.\par Pt denies significant sleep complaints. 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 Mathur in the past for possible bx. He did not f/u for bx. Recent CT revealed stable CEDRICK mass but now with increased RLL interstitial markings which may be the result of a prior CAP for which he was hospitalized in 8/2020 with the following d/c summary:\par \par Hospital Course:\par Discharge Date 05-Aug-2020\par Admission Date 01-Aug-2020 20:52\par Reason for Admission AMS, Acute Hypoxemic Respiratory Failure, Suspected COVID19 Viral PNA, Hyponatremia\par Hospital Course \par 70 y/o M with PMHX 3.4cm L Lung Mass (known, refusing workup/surgical eval/biopsy), Hx Intracranial Tumor s/p L Frontal Craniotomy, Seizure Disorder, COPD/Emphysema, Tobacco Abuse/Active Smoker was brought to [facility] by his wife for evaluation of Hiccups x1 week, AMS, and Fever and was subsequently BIBEMS to [facility] ER for further evaluation. Hypoxia noted by EMS with O2sat 89% RA at rest improving to 99% on 6L NC. Fever Tmax 103.4F noted in Triage. +Associated Chills. +Nonproductive Cough/No sputum. Also c/o acute on chronic LE pain (hx LE trauma as a child). +Fatigue, +Gen Weakness.\par Pt noted to have significant hyponatremia on labs with Na 122. CXR Hyperinflated with RLL infiltrates. CTA Chest with emphysema, 3.4cm Left Lung Apical Mass, multiple other scar-like densities, unchanged from prior imaging. Negative for PE. +new GGO RLL and +R Hilar LAD\par \par LE Dopplers negative for DVT (LE pain is chronic per patient). CT head for AMS shows post-surgical changes from L Craniotomy otherwise no acute findings.\par \par He was started on Albuterol inhaler/nebulizer for suspected COPD given extensive smoking hx. He was also started on Spiriva which he has not been using. Overall, he is at baseline but is concerned for his CEDRICK mass. [TextBox_11] : 2 [TextBox_13] : 55 [YearQuit] : 2020 [FreeTextEntry9] : Chest CT [FreeTextEntry8] : RUL lung mass measuring 3.4 cm in size stable from 2016 with new RLL interstitial markings

## 2021-04-12 ENCOUNTER — APPOINTMENT (OUTPATIENT)
Dept: DISASTER EMERGENCY | Facility: CLINIC | Age: 71
End: 2021-04-12

## 2021-04-12 DIAGNOSIS — Z01.818 ENCOUNTER FOR OTHER PREPROCEDURAL EXAMINATION: ICD-10-CM

## 2021-04-13 LAB — SARS-COV-2 N GENE NPH QL NAA+PROBE: NOT DETECTED

## 2021-04-14 ENCOUNTER — APPOINTMENT (OUTPATIENT)
Dept: FAMILY MEDICINE | Facility: CLINIC | Age: 71
End: 2021-04-14
Payer: MEDICARE

## 2021-04-14 ENCOUNTER — LABORATORY RESULT (OUTPATIENT)
Age: 71
End: 2021-04-14

## 2021-04-14 VITALS
BODY MASS INDEX: 27.2 KG/M2 | HEIGHT: 70 IN | TEMPERATURE: 94.3 F | DIASTOLIC BLOOD PRESSURE: 86 MMHG | WEIGHT: 190 LBS | SYSTOLIC BLOOD PRESSURE: 128 MMHG | HEART RATE: 75 BPM | OXYGEN SATURATION: 98 %

## 2021-04-14 DIAGNOSIS — D75.89 OTHER SPECIFIED DISEASES OF BLOOD AND BLOOD-FORMING ORGANS: ICD-10-CM

## 2021-04-14 DIAGNOSIS — R79.9 ABNORMAL FINDING OF BLOOD CHEMISTRY, UNSPECIFIED: ICD-10-CM

## 2021-04-14 PROCEDURE — 99214 OFFICE O/P EST MOD 30 MIN: CPT

## 2021-04-14 PROCEDURE — 99072 ADDL SUPL MATRL&STAF TM PHE: CPT

## 2021-04-14 NOTE — ASSESSMENT
[FreeTextEntry1] : Lung mass: pt reports doing well, pt sees Dr. Spears, PET scan pending, referred to thoracic sx\par Depression: improved, pt reports doing well, no suicidal/homicidal ideation, PHQ9- 2, declined therapist referral, c/t monitor\par Macrocytosis: pt was referred to hematology, trend CBC\par Acute knee pain: s/p mechanical fall, start Tylenol 500 mg po q8h prn for pain, f/u xray\par elevated ALP: f/u isoenzyme\par RTC 3 wks

## 2021-04-14 NOTE — HISTORY OF PRESENT ILLNESS
[FreeTextEntry1] : Follow up  [de-identified] : 69 yo male presents for follow up. Reports pain in left knee, 5-6/10 in severity, after sustaining mechanical fall over table, pain is sharp, mildly improved. Denies fever, chills, cp, palpitations, sob, nv, heat/cold intolerance, dizziness, melena, hematochezia, muscle weakness, loss of sensation, bowel/bladder incontinence or calf pain.\par

## 2021-04-15 ENCOUNTER — APPOINTMENT (OUTPATIENT)
Dept: PULMONOLOGY | Facility: CLINIC | Age: 71
End: 2021-04-15
Payer: MEDICARE

## 2021-04-15 VITALS
TEMPERATURE: 95 F | RESPIRATION RATE: 16 BRPM | OXYGEN SATURATION: 95 % | HEART RATE: 78 BPM | BODY MASS INDEX: 27.2 KG/M2 | WEIGHT: 190 LBS | SYSTOLIC BLOOD PRESSURE: 118 MMHG | HEIGHT: 70 IN | DIASTOLIC BLOOD PRESSURE: 78 MMHG

## 2021-04-15 PROCEDURE — 94727 GAS DIL/WSHOT DETER LNG VOL: CPT

## 2021-04-15 PROCEDURE — 94729 DIFFUSING CAPACITY: CPT

## 2021-04-15 PROCEDURE — 85018 HEMOGLOBIN: CPT | Mod: QW

## 2021-04-15 PROCEDURE — 99215 OFFICE O/P EST HI 40 MIN: CPT

## 2021-04-15 PROCEDURE — 99072 ADDL SUPL MATRL&STAF TM PHE: CPT

## 2021-04-15 PROCEDURE — 94010 BREATHING CAPACITY TEST: CPT

## 2021-04-15 RX ORDER — TIOTROPIUM BROMIDE 18 UG/1
18 CAPSULE ORAL; RESPIRATORY (INHALATION) DAILY
Qty: 30 | Refills: 5 | Status: DISCONTINUED | COMMUNITY
End: 2021-04-15

## 2021-04-15 NOTE — CONSULT LETTER
[Dear  ___] : Dear  [unfilled], [Consult Letter:] : I had the pleasure of evaluating your patient, [unfilled]. [Please see my note below.] : Please see my note below. [Consult Closing:] : Thank you very much for allowing me to participate in the care of this patient.  If you have any questions, please do not hesitate to contact me. [Sincerely,] : Sincerely, [FreeTextEntry3] : Jeffery Spears MD, FCCP, D. ABSM\par Pulmonary and Sleep Medicine\par [facility] Pulmonary [facility] at [place]

## 2021-04-15 NOTE — PROCEDURE
[FreeTextEntry1] : PFTs 4/15/21 - Minimally reduced FVC and mildly reduced FEV1 with an obstructive pattern. Lung volumes were mildly reduced though with normal TLC. Diffusion capacity is moderately reduced which improved but did not fully correct for alveolar volume.\par

## 2021-04-15 NOTE — HISTORY OF PRESENT ILLNESS
[Former] : former [Initial Eval - Existing Diagnosis] : an initial evaluation of an existing diagnosis of [Dyspnea on Exertion] : dyspnea on exertion [Currently Experiencing] : The patient is currently experiencing symptoms. [On ___] : performed on [unfilled] [Patient] : the patient [Indication ___] : for an indication of [unfilled] [None] : no new symptoms reported [TextBox_4] : Patient c/o SOBOE but is otherwise without associated respiratory complaints. \par Pt is former smoker of 2 ppd x 55 years, quit 8/2020.\par Pt denies significant sleep complaints. 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 Mathur in the past for possible bx. He did not f/u for bx. Recent CT revealed stable CEDRICK mass but now with increased RLL interstitial markings which may be the result of a prior CAP for which he was hospitalized in 8/2020 with the following hospital course:\par \par 70 y/o M with PMHX 3.4cm L Lung Mass (known, refusing workup/surgical eval/biopsy), Hx Intracranial Tumor s/p L Frontal Craniotomy, Seizure Disorder, COPD/Emphysema, Tobacco Abuse/Active Smoker was brought to [facility] by his wife for evaluation of Hiccups x1 week, AMS, and Fever and was subsequently BIBEMS to [facility] ER for further evaluation. Hypoxia noted by EMS with O2sat 89% RA at rest improving to 99% on 6L NC. Fever Tmax 103.4F noted in Triage. +Associated Chills. +Nonproductive Cough/No sputum. Also c/o acute on chronic LE pain (hx LE trauma as a child). +Fatigue, +Gen Weakness.\par Pt noted to have significant hyponatremia on labs with Na 122. CXR Hyperinflated with RLL infiltrates. CTA Chest with emphysema, 3.4cm Left Lung Apical Mass, multiple other scar-like densities, unchanged from prior imaging. Negative for PE. +new GGO RLL and +R Hilar LAD\par LE Dopplers negative for DVT (LE pain is chronic per patient). CT head for AMS shows post-surgical changes from L Craniotomy otherwise no acute findings.\par \par He was started on Albuterol inhaler/nebulizer for suspected COPD given extensive smoking hx. He was also started on Spiriva which he has not been using. Overall, he is at baseline but is concerned for his CEDRICK mass. He did not have his PET CT and has not had a thoracic surgery evaluation to this point. [TextBox_11] : 2 [TextBox_13] : 55 [YearQuit] : 2020 [FreeTextEntry9] : Chest CT [FreeTextEntry8] : RUL lung mass measuring 3.4 cm in size stable from 2016 with new RLL interstitial markings

## 2021-04-15 NOTE — DISCUSSION/SUMMARY
[FreeTextEntry1] : \par #1. PFTs are c/w mild COPD with a moderately reduced diffusion capacity which corrects somewhat for alveolar volume\par #2. Start Trelegy 100 daily\par #3. Diet and exercise for weight loss\par #4.  Albuterol inhaler as needed\par #5. PET CT to evaluate RUL mass and BRANDO and re-evaluate RLL infiltrate (? pneumonia) - not done for this visit\par #6. F/u with Dr. Mathur for possible bx after PET CT\par #7. Pt s/p 2 ppd smoking hx for 55 years, quit 8/2020\par #8. S/p both Covid vaccines\par #9. F/u in 2-3 weeks with PET CT and after thoracic surgery evaluation\par #10. Reviewed risks of exposure and symptoms of Covid-19 virus, including how the virus is spread and precautions to avoid ran virus.\par \par Patient's questions were answered and patient appears to understand these recommendations \par Discussed above with patient and wife who was also present.

## 2021-04-15 NOTE — REASON FOR VISIT
[Follow-Up] : a follow-up visit [Abnormal CXR/ Chest CT] : an abnormal CXR/ chest CT [COPD] : COPD [Shortness of Breath] : shortness of breath [Spouse] : spouse [TextBox_44] : lung mass, former smoker

## 2021-04-21 LAB
ALP BLD-CCNC: 172 IU/L
ALP BONE CFR SERPL: 22 %
ALP INTEST CFR SERPL: 20 %
ALP LIVER CFR SERPL: 58 %
BASOPHILS # BLD AUTO: 0.04 K/UL
BASOPHILS NFR BLD AUTO: 0.7 %
EOSINOPHIL # BLD AUTO: 0.16 K/UL
EOSINOPHIL NFR BLD AUTO: 2.6 %
HCT VFR BLD CALC: 41.3 %
HGB BLD-MCNC: 12.9 G/DL
IMM GRANULOCYTES NFR BLD AUTO: 0.7 %
LYMPHOCYTES # BLD AUTO: 2.19 K/UL
LYMPHOCYTES NFR BLD AUTO: 36.2 %
MAN DIFF?: NORMAL
MCHC RBC-ENTMCNC: 31.2 GM/DL
MCHC RBC-ENTMCNC: 33.7 PG
MCV RBC AUTO: 107.8 FL
MONOCYTES # BLD AUTO: 0.66 K/UL
MONOCYTES NFR BLD AUTO: 10.9 %
NEUTROPHILS # BLD AUTO: 2.96 K/UL
NEUTROPHILS NFR BLD AUTO: 48.9 %
PLATELET # BLD AUTO: 278 K/UL
RBC # BLD: 3.83 M/UL
RBC # FLD: 14.5 %
WBC # FLD AUTO: 6.05 K/UL

## 2021-05-06 ENCOUNTER — APPOINTMENT (OUTPATIENT)
Dept: PULMONOLOGY | Facility: CLINIC | Age: 71
End: 2021-05-06
Payer: MEDICARE

## 2021-05-06 VITALS — DIASTOLIC BLOOD PRESSURE: 80 MMHG | SYSTOLIC BLOOD PRESSURE: 132 MMHG | HEART RATE: 78 BPM | OXYGEN SATURATION: 93 %

## 2021-05-06 VITALS — WEIGHT: 195 LBS | BODY MASS INDEX: 27.98 KG/M2

## 2021-05-06 DIAGNOSIS — E66.3 OVERWEIGHT: ICD-10-CM

## 2021-05-06 DIAGNOSIS — Z87.891 PERSONAL HISTORY OF NICOTINE DEPENDENCE: ICD-10-CM

## 2021-05-06 PROCEDURE — 99215 OFFICE O/P EST HI 40 MIN: CPT

## 2021-05-06 PROCEDURE — 99072 ADDL SUPL MATRL&STAF TM PHE: CPT

## 2021-05-06 NOTE — CONSULT LETTER
[Dear  ___] : Dear  [unfilled], [Consult Letter:] : I had the pleasure of evaluating your patient, [unfilled]. [Please see my note below.] : Please see my note below. [Consult Closing:] : Thank you very much for allowing me to participate in the care of this patient.  If you have any questions, please do not hesitate to contact me. [Sincerely,] : Sincerely, [FreeTextEntry3] : Jeffery Spears MD, FCCP, D. ABSM\par Pulmonary and Sleep Medicine\par [facility] at [place]  [DrKirby  ___] : Dr. PANIAGUA
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
## 2021-05-06 NOTE — DISCUSSION/SUMMARY
[FreeTextEntry1] : \par #1. PFTs are c/w mild COPD with a moderately reduced diffusion capacity which corrects somewhat for alveolar volume\par #2. Start Trelegy 100 daily\par #3. Diet and exercise for weight loss\par #4.  Albuterol inhaler as needed\par #5. PET CT to evaluate RUL mass and BRANDO revealed mild CEDRICK uptake in the ovoid opacity\par #6. F/u with Dr. Mathur for possible bx given mild uptake on PET CT\par #7. Pt s/p 2 ppd smoking hx for 55 years, quit 8/2020\par #8. S/p both Covid vaccines\par #9. F/u in 2-3 weeks with PET CT and after thoracic surgery evaluation\par #10. Reviewed risks of exposure and symptoms of Covid-19 virus, including how the virus is spread and precautions to avoid ran virus.\par \par Patient's questions were answered and patient appears to understand these recommendations \par Discussed above with patient and wife who was also present.

## 2021-05-10 ENCOUNTER — APPOINTMENT (OUTPATIENT)
Dept: THORACIC SURGERY | Facility: CLINIC | Age: 71
End: 2021-05-10
Payer: MEDICARE

## 2021-05-10 VITALS
DIASTOLIC BLOOD PRESSURE: 87 MMHG | RESPIRATION RATE: 18 BRPM | HEIGHT: 70 IN | HEART RATE: 76 BPM | SYSTOLIC BLOOD PRESSURE: 139 MMHG | BODY MASS INDEX: 27.2 KG/M2 | WEIGHT: 190 LBS | OXYGEN SATURATION: 96 %

## 2021-05-10 PROCEDURE — 99214 OFFICE O/P EST MOD 30 MIN: CPT

## 2021-05-10 PROCEDURE — 99072 ADDL SUPL MATRL&STAF TM PHE: CPT

## 2021-05-10 RX ORDER — ZOSTER VACCINE RECOMBINANT, ADJUVANTED 50 MCG/0.5
50 KIT INTRAMUSCULAR ONCE
Qty: 1 | Refills: 0 | Status: COMPLETED | COMMUNITY
Start: 2020-09-01 | End: 2021-05-10

## 2021-05-10 RX ORDER — PHENYLEPHRINE HCL 10 MG
7 TABLET ORAL DAILY
Qty: 30 | Refills: 0 | Status: COMPLETED | COMMUNITY
Start: 2020-08-11 | End: 2021-05-10

## 2021-05-10 NOTE — CONSULT LETTER
[Dear  ___] : Dear  [unfilled], [Consult Letter:] : I had the pleasure of evaluating your patient, [unfilled]. [Please see my note below.] : Please see my note below. [Consult Closing:] : Thank you very much for allowing me to participate in the care of this patient.  If you have any questions, please do not hesitate to contact me. [Sincerely,] : Sincerely, [FreeTextEntry2] : Dr. Jeffery Spears  [FreeTextEntry3] : Tone Mathur MD\par Department of Cardiovascular and Thoracic Surgery\par \[facility] [employer]\[facility] [employer] [facility]

## 2021-05-10 NOTE — HISTORY OF PRESENT ILLNESS
[FreeTextEntry1] : Mr. VIVEROS is a 70 year old male  is a former smoker 1/2 PPD x 55 years; quit 8/2020 who was referred by Dr. Jeffery Spears  for a follow up on a  left upper lobe mass discovered on Chest CT Scan in 10.1.2016 and now for  peripheral ground-glass opacity in the right lower lobe.  He was seen in our our office several years ago and was supposed to schedule for a biopsy but he did not followup.\par \par Today he complains of worsening  shortness of breath on exertion and bilateral rib cage pain exacerbated with movement, however he denies  dizziness,  unintentional weight loss, cough, fever or chills. His current medication includes Albuterol inhaler and Trellegy. He is here to discuss interventional management. \par

## 2021-05-10 NOTE — ASSESSMENT
[FreeTextEntry1] : Daniel is a 70-year-old male with progressive shortness of breath and a recent evaluation, including a CAT scan and PET scan that showed bilateral patchy opacities in addition to a left upper lobe pleural-based nodule. The nodule is calcified but does show some activity by PET scan. He does have a history of TB in the past and was quite ill at that time. These may be TB related changes or related to recent pneumonia. Regardless, I have recommended he continue surveillance with a CT scan in 4-6 months time. I look forward to seeing him after that is complete.\par \par Thank you for allowing me to participate in the care of your patient.\par \par 45 minutes was spent during this encounter.\par \par Tone Mathur MD\par Department of Cardiovascular and Thoracic Surgery\par \[employer]\[facility] [employer] [facility]\par

## 2021-05-10 NOTE — REASON FOR VISIT
[Initial Evaluation] : an initial evaluation [FreeTextEntry1] : left upper lobe mass  and Peripheral ground-glass opacity in the right lower lobe which may be infectious or inflammatory including atypical/viral infection such as COVID 19

## 2021-05-10 NOTE — PHYSICAL EXAM
[General Appearance - Alert] : alert [General Appearance - In No Acute Distress] : in no acute distress [] : no respiratory distress [Auscultation Breath Sounds / Voice Sounds] : lungs were clear to auscultation bilaterally [Heart Rate And Rhythm] : heart rate was normal and rhythm regular [Heart Sounds] : normal S1 and S2 [Heart Sounds Gallop] : no gallops [Murmurs] : no murmurs [Heart Sounds Pericardial Friction Rub] : no pericardial rub [Examination Of The Chest] : the chest was normal in appearance [Chest Visual Inspection Thoracic Asymmetry] : no chest asymmetry [Diminished Respiratory Excursion] : normal chest expansion [No Spinal Tenderness] : no spinal tenderness [No Focal Deficits] : no focal deficits [Oriented To Time, Place, And Person] : oriented to person, place, and time [Impaired Insight] : insight and judgment were intact [Affect] : the affect was normal

## 2021-05-10 NOTE — DATA REVIEWED
[FreeTextEntry1] : 4.22.21 PET CT Tumor imaging (Skull to Thigh) at [facility] \par -No suspicious right upper lobe  mass with minimal progression focal scarring in the right lung apex and along the right minor tissue\par -Progressing calcifications involving pleural based masslike nodularity in the left upper lobe  favors a benign etiology. Slightly focal uptake  associated with  new  noncalcified  component in  the more  central  left upper lobe  is more  nonspecific  but favors postinflammatory /infectious atelectasis or evolving  scaring\par -No suspicious lymph nodes in the chest \par -Status post left frontotemporal craniotomy with associated encephalomalacic changes in the  underlying  brain \par \par 4.15.21  PFTs at  [facility] [place] Reviewed \par FEV1  64.1 \par \par 8.1. 2020 CTA Chest at [facility] \par -No pulmonary embolism. \par -Peripheral ground-glass opacity in the right lower lobe which may be infectious or inflammatory including atypical/viral infection such as COVID 19. Mildly enlarged right hilar lymph nodes may be reactive. \par -Otherwise, no interval change in lung findings compared to prior exam from 10/1/2016. \par - Interval progression of architectural changes in the lungs and degenerative  disease is not unexpected\par \par 10.1.2016 IV Contrast CT Chest  at [facility] \par Posterior left apical mass measuring 2.9 x 2.3  cm as described above in the location of the previously  noted  cavitating lesion  seen on  2.17.2004 with additional similar appearing  smaller nodular densities  in the  medial posterior left apex. Findings may  represent sequela of prior  infection seen on 2004 however malignancy  cannot be excluded.

## 2021-05-13 ENCOUNTER — RX RENEWAL (OUTPATIENT)
Age: 71
End: 2021-05-13

## 2021-05-17 ENCOUNTER — APPOINTMENT (OUTPATIENT)
Dept: FAMILY MEDICINE | Facility: CLINIC | Age: 71
End: 2021-05-17
Payer: MEDICARE

## 2021-05-17 ENCOUNTER — LABORATORY RESULT (OUTPATIENT)
Age: 71
End: 2021-05-17

## 2021-05-17 VITALS
BODY MASS INDEX: 27.2 KG/M2 | HEIGHT: 70 IN | HEART RATE: 83 BPM | SYSTOLIC BLOOD PRESSURE: 124 MMHG | OXYGEN SATURATION: 96 % | TEMPERATURE: 96.8 F | WEIGHT: 190 LBS | DIASTOLIC BLOOD PRESSURE: 82 MMHG

## 2021-05-17 PROCEDURE — 99072 ADDL SUPL MATRL&STAF TM PHE: CPT

## 2021-05-17 PROCEDURE — 99214 OFFICE O/P EST MOD 30 MIN: CPT

## 2021-05-17 NOTE — ASSESSMENT
[FreeTextEntry1] : Abnormal xray of left knee: arthritis in all compartments, refer to PT, refer to orthopedist for steroid injection\par Lucency in tibia/femur seen on xray: f/u MR femur/tibia\par RTC 2 wks

## 2021-05-17 NOTE — HISTORY OF PRESENT ILLNESS
[FreeTextEntry1] : Follow Up left knee  [de-identified] : 69 yo male presents for follow up of recent imaging. Reports persistent left knee pain, 5-6/10 in severity, dull, worse with use. Denies fever, chills, cp, palpitations, sob, nv, heat/cold intolerance, dizziness, melena, hematochezia, muscle weakness, loss of sensation, bowel/bladder incontinence or calf pain.\par

## 2021-05-17 NOTE — PHYSICAL EXAM
[Normal] : affect was normal and insight and judgment were intact [de-identified] : left knee swelling 1+

## 2021-05-21 ENCOUNTER — RX RENEWAL (OUTPATIENT)
Age: 71
End: 2021-05-21

## 2021-05-24 LAB
ALBUMIN SERPL ELPH-MCNC: 4.7 G/DL
ALP BLD-CCNC: 169 U/L
ALT SERPL-CCNC: 18 U/L
ANION GAP SERPL CALC-SCNC: 11 MMOL/L
APPEARANCE: CLEAR
AST SERPL-CCNC: 26 U/L
BACTERIA: NEGATIVE
BASOPHILS # BLD AUTO: 0.03 K/UL
BASOPHILS NFR BLD AUTO: 0.5 %
BILIRUB SERPL-MCNC: <0.2 MG/DL
BILIRUBIN URINE: NEGATIVE
BLOOD URINE: NEGATIVE
BUN SERPL-MCNC: 24 MG/DL
CALCIUM SERPL-MCNC: 9.9 MG/DL
CHLORIDE SERPL-SCNC: 106 MMOL/L
CO2 SERPL-SCNC: 20 MMOL/L
COLOR: YELLOW
CREAT SERPL-MCNC: 1.22 MG/DL
EOSINOPHIL # BLD AUTO: 0.11 K/UL
EOSINOPHIL NFR BLD AUTO: 1.7 %
GLUCOSE QUALITATIVE U: NEGATIVE
GLUCOSE SERPL-MCNC: 114 MG/DL
HCT VFR BLD CALC: 43.7 %
HGB BLD-MCNC: 13.6 G/DL
HYALINE CASTS: 0 /LPF
IMM GRANULOCYTES NFR BLD AUTO: 0.3 %
KETONES URINE: NEGATIVE
LEUKOCYTE ESTERASE URINE: NEGATIVE
LYMPHOCYTES # BLD AUTO: 2.53 K/UL
LYMPHOCYTES NFR BLD AUTO: 39 %
MAN DIFF?: NORMAL
MCHC RBC-ENTMCNC: 31.1 GM/DL
MCHC RBC-ENTMCNC: 34 PG
MCV RBC AUTO: 109.3 FL
MICROSCOPIC-UA: NORMAL
MONOCYTES # BLD AUTO: 0.6 K/UL
MONOCYTES NFR BLD AUTO: 9.3 %
NEUTROPHILS # BLD AUTO: 3.19 K/UL
NEUTROPHILS NFR BLD AUTO: 49.2 %
NITRITE URINE: NEGATIVE
PH URINE: 6
PLATELET # BLD AUTO: 266 K/UL
POTASSIUM SERPL-SCNC: 4.7 MMOL/L
PROT SERPL-MCNC: 7.8 G/DL
PROTEIN URINE: NORMAL
RBC # BLD: 4 M/UL
RBC # FLD: 14.6 %
RED BLOOD CELLS URINE: 1 /HPF
SODIUM SERPL-SCNC: 137 MMOL/L
SPECIFIC GRAVITY URINE: 1.03
SQUAMOUS EPITHELIAL CELLS: 0 /HPF
TSH SERPL-ACNC: 1.37 UIU/ML
UROBILINOGEN URINE: NORMAL
WBC # FLD AUTO: 6.48 K/UL
WHITE BLOOD CELLS URINE: 0 /HPF

## 2021-06-17 ENCOUNTER — APPOINTMENT (OUTPATIENT)
Dept: ORTHOPEDIC SURGERY | Facility: CLINIC | Age: 71
End: 2021-06-17
Payer: MEDICARE

## 2021-06-17 DIAGNOSIS — M89.9 DISORDER OF BONE, UNSPECIFIED: ICD-10-CM

## 2021-06-17 PROCEDURE — 20600 DRAIN/INJ JOINT/BURSA W/O US: CPT | Mod: LT

## 2021-06-17 PROCEDURE — 99204 OFFICE O/P NEW MOD 45 MIN: CPT | Mod: 25

## 2021-06-17 PROCEDURE — 99072 ADDL SUPL MATRL&STAF TM PHE: CPT
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
## 2021-06-17 NOTE — PHYSICAL EXAM
[FreeTextEntry1] : Physical exam revealed a patient who has very short of breath related to chronic obstructive lung disease complaining about left knee pain related to an degenerative arthritic changes.  Examination of the left leg demonstrate full active extension and flexion and patient having a old scars along the left leg the knee is stable no joint effusion and review of her new plain x-ray of the left femur and tibia demonstrate a pre-existing nonaggressive lucency over the left distal femur most likely related to process during childhood.  However at the same time patient having degenerative arthritic changes with 3 compartmental arthritis.  And at this stage this condition was discussed with the patient who was recommended to be seen by a knee specialist to consider a treatment as necessary.  At the same time and patient received at the same time patient was given intra-articular lidocaine 1% with Depo-Medrol injection as soon after the injection a all the pain over the left knee subsided which indicating that the main concern and pain related to the knee joint.  At this point there is no need for oncological intervention and patient was recommended to be followed by general orthopedic surgeon and to be seen as needed.
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
## 2021-06-24 ENCOUNTER — APPOINTMENT (OUTPATIENT)
Dept: ORTHOPEDIC SURGERY | Facility: CLINIC | Age: 71
End: 2021-06-24

## 2021-08-18 ENCOUNTER — APPOINTMENT (OUTPATIENT)
Dept: NEUROLOGY | Facility: CLINIC | Age: 71
End: 2021-08-18
Payer: MEDICARE

## 2021-08-18 VITALS
DIASTOLIC BLOOD PRESSURE: 80 MMHG | BODY MASS INDEX: 27.2 KG/M2 | SYSTOLIC BLOOD PRESSURE: 138 MMHG | HEIGHT: 70 IN | WEIGHT: 190 LBS

## 2021-08-18 PROCEDURE — 99213 OFFICE O/P EST LOW 20 MIN: CPT

## 2021-08-18 NOTE — PHYSICAL EXAM
[General Appearance - Alert] : alert [General Appearance - In No Acute Distress] : in no acute distress [Oriented To Time, Place, And Person] : oriented to person, place, and time [Affect] : the affect was normal [Cranial Nerves Optic (II)] : visual acuity intact bilaterally,  visual fields full to confrontation, pupils equal round and reactive to light [Cranial Nerves Oculomotor (III)] : extraocular motion intact [Cranial Nerves Facial (VII)] : face symmetrical [Cranial Nerves Trigeminal (V)] : facial sensation intact symmetrically [Cranial Nerves Vestibulocochlear (VIII)] : hearing was intact bilaterally [Cranial Nerves Glossopharyngeal (IX)] : tongue and palate midline [Cranial Nerves Accessory (XI - Cranial And Spinal)] : head turning and shoulder shrug symmetric [Cranial Nerves Hypoglossal (XII)] : there was no tongue deviation with protrusion [Motor Tone] : muscle tone was normal in all four extremities [Motor Strength] : muscle strength was normal in all four extremities [Sensation Tactile Decrease] : light touch was intact [Sensation Pain / Temperature Decrease] : pain and temperature was intact [Sensation Vibration Decrease] : vibration was intact [Romberg's Sign] : a positive Romberg's sign was present [Limited Balance] : the patient's balance was impaired [2+] : Patella left 2+ [Optic Disc Abnormality] : the optic disc were normal in size and color [PERRL With Normal Accommodation] : pupils were equal in size, round, reactive to light, with normal accommodation [Edema] : there was no peripheral edema [Involuntary Movements] : no involuntary movements were seen [FreeTextEntry1] : He has some difficulty with short-term memory. Remote memory is better preserved the [Dysarthria] : no dysarthria [Aphasia] : no dysphasia/aphasia [Plantar Reflex Right Only] : normal on the right [Coordination - Dysmetria Impaired Finger-to-Nose Bilateral] : not present [Plantar Reflex Left Only] : normal on the left [FreeTextEntry8] : The patient's balance is poor and he uses a cane for support.

## 2021-08-18 NOTE — DATA REVIEWED
[de-identified] : CT scan of the head performed on 10/1/16 was reviewed. This study demonstrated only chronic postoperative changes graded there was no enhancement to suggest recurrent tumor.

## 2021-10-20 ENCOUNTER — APPOINTMENT (OUTPATIENT)
Dept: FAMILY MEDICINE | Facility: CLINIC | Age: 71
End: 2021-10-20
Payer: MEDICARE

## 2021-10-20 VITALS
DIASTOLIC BLOOD PRESSURE: 90 MMHG | HEIGHT: 70 IN | TEMPERATURE: 97.3 F | OXYGEN SATURATION: 96 % | BODY MASS INDEX: 28.35 KG/M2 | SYSTOLIC BLOOD PRESSURE: 140 MMHG | HEART RATE: 90 BPM | WEIGHT: 198 LBS

## 2021-10-20 PROCEDURE — 90662 IIV NO PRSV INCREASED AG IM: CPT

## 2021-10-20 PROCEDURE — G0008: CPT

## 2021-10-20 PROCEDURE — 99214 OFFICE O/P EST MOD 30 MIN: CPT | Mod: 25
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
## 2021-10-20 NOTE — ASSESSMENT
[FreeTextEntry1] : Left knee arthritis: c/w tylenol prn for pain, diclofenac gel prn for pain, recommend PT, f/u orthopedist\par Elevated BP without diagnosis of HTN: bp well controlled in the past, elevated bp may be related to pain, start tylenol prn for pain, repeat bp check in 2 - 3 wks\par RTC 2 - 3 wks for bp check/fu

## 2021-10-20 NOTE — PHYSICAL EXAM
[Normal] : affect was normal and insight and judgment were intact [de-identified] : left knee ROm intact, non tender, mild swelling [de-identified] : see msk

## 2021-11-10 ENCOUNTER — APPOINTMENT (OUTPATIENT)
Dept: FAMILY MEDICINE | Facility: CLINIC | Age: 71
End: 2021-11-10
Payer: MEDICARE

## 2021-11-10 DIAGNOSIS — Z01.84 ENCOUNTER FOR ANTIBODY RESPONSE EXAMINATION: ICD-10-CM

## 2021-11-10 PROCEDURE — 36415 COLL VENOUS BLD VENIPUNCTURE: CPT

## 2021-11-11 ENCOUNTER — APPOINTMENT (OUTPATIENT)
Dept: ORTHOPEDIC SURGERY | Facility: CLINIC | Age: 71
End: 2021-11-11
Payer: MEDICARE

## 2021-11-11 VITALS
HEIGHT: 70 IN | SYSTOLIC BLOOD PRESSURE: 150 MMHG | BODY MASS INDEX: 28.35 KG/M2 | HEART RATE: 76 BPM | WEIGHT: 198 LBS | DIASTOLIC BLOOD PRESSURE: 91 MMHG

## 2021-11-11 LAB
COVID-19 NUCLEOCAPSID  GAM ANTIBODY INTERPRETATION: NEGATIVE
COVID-19 SPIKE DOMAIN ANTIBODY INTERPRETATION: POSITIVE
SARS-COV-2 AB SERPL IA-ACNC: 110 U/ML
SARS-COV-2 AB SERPL QL IA: 0.09 INDEX

## 2021-11-11 PROCEDURE — 99204 OFFICE O/P NEW MOD 45 MIN: CPT

## 2021-11-11 PROCEDURE — 99214 OFFICE O/P EST MOD 30 MIN: CPT

## 2021-11-11 NOTE — HISTORY OF PRESENT ILLNESS
[de-identified] : Patient is a 70-year-old male here today for evaluation of longstanding left knee pain that has been going on for many years. Patient previously saw a surgical oncologist due to bone deformities in his knee but was told it was related to old injuries that he had as a child. States that there is no oncologic need for intervention his knee. States he feels like there is grinding in his knee. Denies any neurovascular compromise lower extremity. Denies any other mechanical symptoms.

## 2021-11-11 NOTE — DISCUSSION/SUMMARY
[Medication Risks Reviewed] : Medication risks reviewed [Surgical risks reviewed] : Surgical risks reviewed [de-identified] : Patient is a 70-year-old male with posttraumatic arthritis of the left knee. I recommended conservative treatment at this time. I gave him a prescription for physical therapy. I also recommended gel injections. I ordered those for him. He'll follow up with me after the gel injections for repeat examination. He was advised may be a candidate for total knee in the future however due to his history of lung cancer as well as severe COPD we will defer that at this time.

## 2021-11-11 NOTE — PHYSICAL EXAM
[de-identified] : GENERAL APPEARANCE: Well nourished and hydrated, pleasant, alert, and oriented x 3. Appears their stated age. \par HEENT: Normocephalic, extraocular eye motion intact. Nasal septum midline. Oral cavity clear. External auditory canal clear. \par RESPIRATORY: Breath sounds clear and audible in all lobes. No wheezing, No accessory muscle use.\par CARDIOVASCULAR: No apparent abnormalities. No lower leg edema. No varicosities. Pedal pulses are palpable.\par NEUROLOGIC: Sensation is normal, no muscle weakness in the upper or lower extremities.\par DERMATOLOGIC: No apparent skin lesions, moist, warm, no rash.\par SPINE: Cervical spine appears normal and moves freely; thoracic spine appears normal and moves freely; lumbosacral spine appears normal and moves freely, normal, nontender.\par MUSCULOSKELETAL: Hands, wrists, and elbows are normal and move freely, shoulders are normal and move freely. \par Psychiatric: Oriented to person, place, and time, insight and judgement were intact and the affect was normal. \par Musculoskeletal:. Left knee exam shows no effusion, ROM is 0-1 30 degrees, no instability, no pain with Felisa, medial and lateral joint line tenderness. There are well-healed surgical scars present throughout the anterior aspect of the knee.\par 5/5 motor strength in bilateral lower extremities. Sensory: Intact in bilateral lower extremities. DTRs: Biceps, brachioradialis, triceps, patellar, ankle and plantar 2+ and symmetric bilaterally. Pulses: dorsalis pedis, posterior tibial, femoral, popliteal, and radial 2+ and symmetric bilaterally. \par Constitutional: Alert and in no acute distress, but well-appearing.  [de-identified] : 4 views of the left knee brought in from outside show no acute fracture or dislocation. There is tricompartmental degenerative changes as well as old fracture that is healed. No evidence of aggressive bony lesions.

## 2021-11-12 ENCOUNTER — RX RENEWAL (OUTPATIENT)
Age: 71
End: 2021-11-12

## 2021-11-24 ENCOUNTER — APPOINTMENT (OUTPATIENT)
Dept: FAMILY MEDICINE | Facility: CLINIC | Age: 71
End: 2021-11-24

## 2021-11-28 ENCOUNTER — RX RENEWAL (OUTPATIENT)
Age: 71
End: 2021-11-28

## 2021-12-01 ENCOUNTER — APPOINTMENT (OUTPATIENT)
Dept: ORTHOPEDIC SURGERY | Facility: CLINIC | Age: 71
End: 2021-12-01
Payer: MEDICARE

## 2021-12-01 VITALS
DIASTOLIC BLOOD PRESSURE: 89 MMHG | HEIGHT: 70 IN | SYSTOLIC BLOOD PRESSURE: 135 MMHG | HEART RATE: 98 BPM | BODY MASS INDEX: 28.35 KG/M2 | WEIGHT: 198 LBS

## 2021-12-01 PROCEDURE — 20610 DRAIN/INJ JOINT/BURSA W/O US: CPT | Mod: LT

## 2021-12-01 NOTE — REASON FOR VISIT
[Follow-Up Visit] : a follow-up visit for [FreeTextEntry2] : euflexxa left knee lot#[id number] exp:11/15/2022

## 2021-12-01 NOTE — DISCUSSION/SUMMARY
[Medication Risks Reviewed] : Medication risks reviewed [de-identified] : Status post Euflexxa injection 1 of 3 to left knee.  Tolerated well.  Follow-up in 1 week for repeat injection.

## 2021-12-01 NOTE — PROCEDURE
[de-identified] : Euflexxa # 1 Left knee\par Discussed at length with the patient the planned Euflexxa injection. The risks, benefits, convalescence and alternatives were reviewed. The possible side effects discussed included but were not limited to: pain, swelling, heat and redness. There symptoms are generally mild but if they are extensive then contact the office. Giving pain relievers by mouth such as NSAID´s or Tylenol can generally treat the reactions to Euflexxa. Rare cases of infection have been noted. Rash, hives and itching may occur post injection. If you have muscle pain or cramps, flushing and or swelling of the face, rapid heart beat, nausea, dizziness, fever, chills, headache, difficulty breathing, swelling in the arms or legs, or have a prickly feeling of your skin, contact a health care provider immediately.\par \par Following this discussion, the knee was prepped with betadine and under sterile condition the Euflexxa injection was performed with a 22 gauge needle. The needle was introduced into the joint, aspiration was performed to ensure intra-articular placement and the medication was injected. Upon withdrawal of the needle the site was cleaned with alcohol and a bandaid applied. The patient tolerated the injection well and there were no adverse effects. Post injection instructions included no strenuous activity for 24 hours, cryotherapy and if there are any adverse effects to contact the office.\par

## 2021-12-09 ENCOUNTER — APPOINTMENT (OUTPATIENT)
Dept: ORTHOPEDIC SURGERY | Facility: CLINIC | Age: 71
End: 2021-12-09
Payer: MEDICARE

## 2021-12-09 PROCEDURE — 20610 DRAIN/INJ JOINT/BURSA W/O US: CPT | Mod: LT

## 2021-12-09 NOTE — REASON FOR VISIT
[Follow-Up Visit] : a follow-up visit for [Spouse] : spouse [FreeTextEntry2] : left knee pain. Here for 2nd Euflexxa injection to left knee. Lot#[id number], Exp: 11/15/22

## 2021-12-09 NOTE — DISCUSSION/SUMMARY
[Medication Risks Reviewed] : Medication risks reviewed [de-identified] : Status post Euflexxa injection 2 of 3 to left knee.  Tolerated well.  Will follow up in 1 week for repeat injection.

## 2021-12-13 ENCOUNTER — RX RENEWAL (OUTPATIENT)
Age: 71
End: 2021-12-13

## 2021-12-16 ENCOUNTER — APPOINTMENT (OUTPATIENT)
Dept: ORTHOPEDIC SURGERY | Facility: CLINIC | Age: 71
End: 2021-12-16
Payer: MEDICARE

## 2021-12-16 VITALS
HEIGHT: 70 IN | SYSTOLIC BLOOD PRESSURE: 141 MMHG | DIASTOLIC BLOOD PRESSURE: 95 MMHG | HEART RATE: 91 BPM | WEIGHT: 198 LBS | BODY MASS INDEX: 28.35 KG/M2

## 2021-12-16 PROCEDURE — 20610 DRAIN/INJ JOINT/BURSA W/O US: CPT | Mod: LT

## 2021-12-16 NOTE — REASON FOR VISIT
[Initial Visit] : an initial visit for [FreeTextEntry2] : left knee Euflexxa # 3 LOT [id number]  EXP 11/15/2022

## 2021-12-16 NOTE — PROCEDURE
[de-identified] : Euflexxa # 3 Left knee\par Discussed at length with the patient the planned Euflexxa injection. The risks, benefits, convalescence and alternatives were reviewed. The possible side effects discussed included but were not limited to: pain, swelling, heat and redness. There symptoms are generally mild but if they are extensive then contact the office. Giving pain relievers by mouth such as NSAID´s or Tylenol can generally treat the reactions to Euflexxa. Rare cases of infection have been noted. Rash, hives and itching may occur post injection. If you have muscle pain or cramps, flushing and or swelling of the face, rapid heart beat, nausea, dizziness, fever, chills, headache, difficulty breathing, swelling in the arms or legs, or have a prickly feeling of your skin, contact a health care provider immediately.\par \par Following this discussion, the knee was prepped with betadine and under sterile condition the Euflexxa injection was performed with a 22 gauge needle. The needle was introduced into the joint, aspiration was performed to ensure intra-articular placement and the medication was injected. Upon withdrawal of the needle the site was cleaned with alcohol and a bandaid applied. The patient tolerated the injection well and there were no adverse effects. Post injection instructions included no strenuous activity for 24 hours, cryotherapy and if there are any adverse effects to contact the office.\par

## 2021-12-16 NOTE — DISCUSSION/SUMMARY
[Medication Risks Reviewed] : Medication risks reviewed [de-identified] : Status post Euflexxa injection 3 of 3 to left knee.  Tolerated well.  Follow-up in 2 months for repeat examination

## 2022-01-07 ENCOUNTER — RX CHANGE (OUTPATIENT)
Age: 72
End: 2022-01-07

## 2022-01-19 ENCOUNTER — RX CHANGE (OUTPATIENT)
Age: 72
End: 2022-01-19

## 2022-02-01 ENCOUNTER — RX RENEWAL (OUTPATIENT)
Age: 72
End: 2022-02-01

## 2022-02-02 ENCOUNTER — APPOINTMENT (OUTPATIENT)
Dept: CARDIOLOGY | Facility: CLINIC | Age: 72
End: 2022-02-02

## 2022-02-03 ENCOUNTER — RX RENEWAL (OUTPATIENT)
Age: 72
End: 2022-02-03

## 2022-02-09 ENCOUNTER — APPOINTMENT (OUTPATIENT)
Dept: CARDIOLOGY | Facility: CLINIC | Age: 72
End: 2022-02-09
Payer: MEDICARE

## 2022-02-09 ENCOUNTER — NON-APPOINTMENT (OUTPATIENT)
Age: 72
End: 2022-02-09

## 2022-02-09 VITALS
TEMPERATURE: 98.4 F | WEIGHT: 195 LBS | SYSTOLIC BLOOD PRESSURE: 110 MMHG | BODY MASS INDEX: 27.92 KG/M2 | DIASTOLIC BLOOD PRESSURE: 70 MMHG | HEART RATE: 80 BPM | OXYGEN SATURATION: 95 % | HEIGHT: 70 IN

## 2022-02-09 PROCEDURE — 93000 ELECTROCARDIOGRAM COMPLETE: CPT

## 2022-02-09 PROCEDURE — 99214 OFFICE O/P EST MOD 30 MIN: CPT

## 2022-02-09 RX ORDER — LIDOCAINE 5% 700 MG/1
5 PATCH TOPICAL
Qty: 7 | Refills: 0 | Status: DISCONTINUED | COMMUNITY
Start: 2021-06-18 | End: 2022-02-09

## 2022-02-09 RX ORDER — HYALURONATE SODIUM 20 MG/2 ML
20 SYRINGE (ML) INTRAARTICULAR
Qty: 1 | Refills: 0 | Status: DISCONTINUED | OUTPATIENT
Start: 2021-11-11 | End: 2022-02-09

## 2022-02-11 ENCOUNTER — RX CHANGE (OUTPATIENT)
Age: 72
End: 2022-02-11

## 2022-02-15 NOTE — H&P ADULT - NSTOBACCOMEDNCS_GEN_A_CORE_ALL
Thoracic Surgery    Patient: Yayo Galvan Date: 2/15/2022   : 1965 Attending: Colleen Gutierrez MD   56 year old male Room: /A     Post-op Day #: 6  Surgical Procedure: robotic transhiatal esophagectomy with transcervical endoscopic esophageal mobilization and botox pyloroplasty    Assessment/Plan:  Distal esophageal cancer - adenocarcinoma s/p robotic transhiatal esophagectomy with transcervical endoscopic esophageal mobilization and botox pyloroplasty: Surgical pathology with rare microscopic foci of residual adenocarcinoma, [id number]. Incisions stable-wash daily with soap and water. NG removed POD2. VSS with inconsistent aspiration with inconsistent sensation which resolved with chin tuck. Started on clears with chin tuck per ST  with return of bowel function. Increased to fulls . J tube per surg onc team, TF were held d/t ileus--resumed . HOB at 45 degrees or greater at all times. Do not lower HOB for imaging, transferring or any other reason.  Ambulate every 4 hours around the clock.   ABD pain/bloating: KUB with dilated air filled bowel suggestive of ileus or obstruction. Now passing flatus and having BMs. abd semi firm--improved.  KUB repeated over weekend and unchanged. Diet and TF resumed slowly as above. On Colace and daily suppositories. Ambulating. Monitor closely.   Hypoxemia: IS/CDB/OOB. Ambulate. Maintaining saturations on RA today. Promote cough clearance. Mucolytic. Duonebs.   Post operative blood loss anemia: Expected. Monitor.   Acute post operative pain: Physical therapy. Controlled on liquid meds per Jtube.   Nausea: prn antiemetics. Resolved per per pt.   Hx DVT / PE: bridged with lovenox preop. Plts 94 today. Xarelto through Jtube     Discharge Disposition: To be determined Possible home with home care today.     Smoking cessatation counseling offered or initiated: Patient verbalizes readiness to quit smoking    Subjective: +Flatus, +BMs per pt. Feels better.  Tolerating clears with chin tuck and TF. Voice hoarse this am \"always hoarse in the morning\" per pt. Productive cough.      Vital 24 Hour Range Last Value   Temperature Temp  Min: 98.1 °F (36.7 °C)  Max: 98.5 °F (36.9 °C) 98.5 °F (36.9 °C) (02/14/22 1552)   Pulse Pulse  Min: 80  Max: 91 84 (02/15/22 0813)   Respiratory Resp  Min: 18  Max: 18 18 (02/15/22 0813)   Non-Invasive  Blood Pressure BP  Min: 95/58  Max: 107/69 107/69 (02/15/22 0813)   Pulse Oximetry SpO2  Min: 93 %  Max: 96 % 94 % (02/15/22 0813)     Oxygen: RA    Weight over the past 48 Hours:  Patient Vitals for the past 48 hrs:   Weight   02/14/22 0052 67.4 kg (148 lb 9.4 oz)   02/15/22 0630 67.2 kg (148 lb 2.4 oz)      Admit Weight:   Weight: 61.7 kg (136 lb) (02/08/22 0449)  BMI:   BMI (Calculated): 18.44 (02/08/22 0449)    Intake/Output:    Last Stool Occurrence: 1 (02/14/22 1604)  No intake/output data recorded.  I/O last 3 completed shifts:  In: 2802.2 [P.O.:640; I.V.:1065.2; NG/GT:1097]  Out: 800 [Urine:800]      Intake/Output Summary (Last 24 hours) at 2/15/2022 0835  Last data filed at 2/15/2022 0645  Gross per 24 hour   Intake 2742.22 ml   Output 400 ml   Net 2342.22 ml       Diet: Nissen fulls,chin tuck and TFs    Activity: ambulate every four hour around the clock    Rhythm: Sinus rhythm    Physical Exam:   Heart: Regular rate and rhythm and S1, S2 normal  Lungs: Diminished breath sounds  bibasilar  Abdomen: abnormal findings:  +BS, semi firm, +flatus; Jtube TFs started  Incision(s): neck incision CDI, midline and abdominal lap sites CDI  Neurologic: Alert and oriented to person, place and time  Extremities: extremities normal, atraumatic, no cyanosis or edema    Laboratory Results:    Recent Labs   Lab 02/15/22  0759 02/15/22  0618 02/14/22  0522 02/13/22  0509   SODIUM  --  137 138 139   POTASSIUM  --  4.3 3.7 3.6   CHLORIDE  --  106 108* 109*   CO2  --  26 26 26   BUN  --  3* 4* 4*   CREATININE  --  0.48* 0.53* 0.53*   GLUCOSE  --  103* 105*  114*   MG  --  2.1 2.1 2.1   WBC 4.8  --  3.7* 4.3   HGB 9.1*  --  9.0* 9.5*   HCT 27.8*  --  27.3* 28.3*   *  --  94* 74*     Pathology/Cultures: Reviewed  Pathologic Diagnosis   A.   Esophagus, proximal, excision:  -No significant histopathology.     B.   Distal esophagus and proximal stomach, esophagogastrectomy:  -Rare microscopic foci of residual adenocarcinoma (up to 0.2 cm), involving the submucosa of the gastroesophageal junction, in a background of acellular mucin and dystrophic calcifications; status post neoadjuvant therapy with prominent therapy-related cytoreduction.  -Margins are negative for malignancy (closest, radial: 1.4 cm).  -15 lymph nodes, all negative for metastatic carcinoma (0/15).  -AJCC stage:  ypT1b  ypN0.     Chest X-Ray: Reviewed    Medications/Infusions:  Scheduled:   • nicotine  1 patch Transdermal Daily   • rivaroxaban  20 mg Per J Tube Daily with dinner   • bisacodyl  10 mg Rectal Daily   • guaifenesin  200 mg Per J Tube 6 times per day   • docusate sodium  100 mg Per J Tube Daily   • Potassium Standard Replacement Protocol   Does not apply See Admin Instructions   • Magnesium Standard Replacement Protocol   Does not apply See Admin Instructions   • Phosphorus Standard Replacement Protocol   Does not apply See Admin Instructions   • ipratropium-albuterol  3 mL Nebulization Q6H Resp   • famotidine  20 mg Intravenous 2 times per day   • metoCLOPramide (REGLAN) injection  10 mg Intravenous 4 times per day       Continuous Infusions:   • dextrose 5 % / sodium chloride 0.45% infusion 100 mL/hr at 02/15/22 0804   • sodium chloride 0.9% infusion     • sodium chloride 0.9% infusion         Discussed with or notes reviewed:  Patient, RN and MD,SLP, will d/w surg onc, RD    CLAUDIA Dean  Thoracic Surgery  [phone]       Offered and patient declined

## 2022-02-21 ENCOUNTER — RX RENEWAL (OUTPATIENT)
Age: 72
End: 2022-02-21

## 2022-03-24 ENCOUNTER — APPOINTMENT (OUTPATIENT)
Dept: NEUROLOGY | Facility: CLINIC | Age: 72
End: 2022-03-24
Payer: MEDICARE

## 2022-03-24 VITALS
SYSTOLIC BLOOD PRESSURE: 120 MMHG | DIASTOLIC BLOOD PRESSURE: 80 MMHG | WEIGHT: 195 LBS | HEIGHT: 70 IN | BODY MASS INDEX: 27.92 KG/M2

## 2022-03-24 PROCEDURE — 99214 OFFICE O/P EST MOD 30 MIN: CPT

## 2022-03-24 NOTE — DATA REVIEWED
[de-identified] : CT scan of the head performed on 10/1/16 was reviewed. This study demonstrated only chronic postoperative changes graded there was no enhancement to suggest recurrent tumor.

## 2022-03-24 NOTE — PHYSICAL EXAM
[General Appearance - Alert] : alert [General Appearance - In No Acute Distress] : in no acute distress [Oriented To Time, Place, And Person] : oriented to person, place, and time [Affect] : the affect was normal [Cranial Nerves Optic (II)] : visual acuity intact bilaterally,  visual fields full to confrontation, pupils equal round and reactive to light [Cranial Nerves Oculomotor (III)] : extraocular motion intact [Cranial Nerves Trigeminal (V)] : facial sensation intact symmetrically [Cranial Nerves Facial (VII)] : face symmetrical [Cranial Nerves Vestibulocochlear (VIII)] : hearing was intact bilaterally [Cranial Nerves Glossopharyngeal (IX)] : tongue and palate midline [Cranial Nerves Accessory (XI - Cranial And Spinal)] : head turning and shoulder shrug symmetric [Cranial Nerves Hypoglossal (XII)] : there was no tongue deviation with protrusion [Motor Tone] : muscle tone was normal in all four extremities [Motor Strength] : muscle strength was normal in all four extremities [Sensation Tactile Decrease] : light touch was intact [Sensation Pain / Temperature Decrease] : pain and temperature was intact [Sensation Vibration Decrease] : vibration was intact [Romberg's Sign] : a positive Romberg's sign was present [Limited Balance] : the patient's balance was impaired [2+] : Patella left 2+ [PERRL With Normal Accommodation] : pupils were equal in size, round, reactive to light, with normal accommodation [Optic Disc Abnormality] : the optic disc were normal in size and color [Edema] : there was no peripheral edema [Involuntary Movements] : no involuntary movements were seen [FreeTextEntry1] : He has some difficulty with short-term memory. Remote memory is better preserved. [Dysarthria] : no dysarthria [Aphasia] : no dysphasia/aphasia [Coordination - Dysmetria Impaired Finger-to-Nose Bilateral] : not present [Plantar Reflex Right Only] : normal on the right [Plantar Reflex Left Only] : normal on the left [FreeTextEntry8] : The patient's balance is poor and he uses a cane for support.

## 2022-03-24 NOTE — ASSESSMENT
[FreeTextEntry1] : This is a 71 year-old man with a history of brain tumor resection as a child.\par \par He remains on Dilantin and phenobarbital. \par I will continue his current doses.\par \par He follows with the cardiologist.\par \par I will see him back in 6 months.

## 2022-06-06 ENCOUNTER — RX RENEWAL (OUTPATIENT)
Age: 72
End: 2022-06-06

## 2022-06-07 ENCOUNTER — RX RENEWAL (OUTPATIENT)
Age: 72
End: 2022-06-07

## 2022-08-10 ENCOUNTER — RX RENEWAL (OUTPATIENT)
Age: 72
End: 2022-08-10

## 2022-08-18 ENCOUNTER — LABORATORY RESULT (OUTPATIENT)
Age: 72
End: 2022-08-18

## 2022-08-18 ENCOUNTER — APPOINTMENT (OUTPATIENT)
Dept: FAMILY MEDICINE | Facility: CLINIC | Age: 72
End: 2022-08-18

## 2022-08-18 VITALS
SYSTOLIC BLOOD PRESSURE: 142 MMHG | WEIGHT: 193 LBS | HEIGHT: 70 IN | OXYGEN SATURATION: 98 % | DIASTOLIC BLOOD PRESSURE: 98 MMHG | BODY MASS INDEX: 27.63 KG/M2 | TEMPERATURE: 97.5 F | HEART RATE: 72 BPM

## 2022-08-18 DIAGNOSIS — Z87.898 PERSONAL HISTORY OF OTHER SPECIFIED CONDITIONS: ICD-10-CM

## 2022-08-18 DIAGNOSIS — M25.562 PAIN IN LEFT KNEE: ICD-10-CM

## 2022-08-18 DIAGNOSIS — Z12.2 ENCOUNTER FOR SCREENING FOR MALIGNANT NEOPLASM OF RESPIRATORY ORGANS: ICD-10-CM

## 2022-08-18 DIAGNOSIS — R03.0 ELEVATED BLOOD-PRESSURE READING, W/OUT DIAGNOSIS OF HYPERTENSION: ICD-10-CM

## 2022-08-18 DIAGNOSIS — Z86.59 PERSONAL HISTORY OF OTHER MENTAL AND BEHAVIORAL DISORDERS: ICD-10-CM

## 2022-08-18 DIAGNOSIS — R26.89 OTHER ABNORMALITIES OF GAIT AND MOBILITY: ICD-10-CM

## 2022-08-18 PROCEDURE — G0439: CPT

## 2022-08-18 PROCEDURE — 36415 COLL VENOUS BLD VENIPUNCTURE: CPT

## 2022-08-18 PROCEDURE — G0444 DEPRESSION SCREEN ANNUAL: CPT | Mod: 59

## 2022-08-18 RX ORDER — ACETAMINOPHEN 500 MG/1
500 TABLET ORAL
Qty: 180 | Refills: 0 | Status: DISCONTINUED | COMMUNITY
Start: 2021-05-17 | End: 2022-08-18

## 2022-08-18 NOTE — COUNSELING
[Fall prevention counseling provided] : Fall prevention counseling provided [Adequate lighting] : Adequate lighting [No throw rugs] : No throw rugs [Use proper foot wear] : Use proper foot wear [Use recommended devices] : Use recommended devices [AUDIT-C Screening administered and reviewed] : AUDIT-C Screening administered and reviewed [Potential consequences of obesity discussed] : Potential consequences of obesity discussed

## 2022-08-18 NOTE — HEALTH RISK ASSESSMENT
[Good] : ~his/her~  mood as  good [Former] : Former [20 or more] : 20 or more [1 or 2 (0 pts)] : 1 or 2 (0 points) [Never (0 pts)] : Never (0 points) [No] : In the past 12 months have you used drugs other than those required for medical reasons? No [No falls in past year] : Patient reported no falls in the past year [0] : 2) Feeling down, depressed, or hopeless: Not at all (0) [PHQ-2 Negative - No further assessment needed] : PHQ-2 Negative - No further assessment needed [Patient declined colonoscopy] : Patient declined colonoscopy [HIV test declined] : HIV test declined [Hepatitis C test declined] : Hepatitis C test declined [With Family] : lives with family [Retired] : retired [] :  [Sexually Active] : sexually active [Feels Safe at Home] : Feels safe at home [Fully functional (bathing, dressing, toileting, transferring, walking, feeding)] : Fully functional (bathing, dressing, toileting, transferring, walking, feeding) [Fully functional (using the telephone, shopping, preparing meals, housekeeping, doing laundry, using] : Fully functional and needs no help or supervision to perform IADLs (using the telephone, shopping, preparing meals, housekeeping, doing laundry, using transportation, managing medications and managing finances) [de-identified] : 45 yrs 1 ppd [YearQuit] : 2021 [Audit-CScore] : 0 [de-identified] : needs improvement [de-identified] : needs improvement [[id number]] : 0 [High Risk Behavior] : no high risk behavior [Reports changes in hearing] : Reports no changes in hearing [Reports changes in vision] : Reports no changes in vision [Reports changes in dental health] : Reports no changes in dental health

## 2022-08-18 NOTE — PHYSICAL EXAM
[Normal] : affect was normal and insight and judgment were intact [de-identified] : RLE motor strength 5/5, LLE 4/5 2/2 knee pain, sensation intact, ROM intact

## 2022-08-18 NOTE — HISTORY OF PRESENT ILLNESS
[FreeTextEntry1] : CHAGO [de-identified] : 72 yo male presents for annual wellness visit. He reports feeling well. No acute complaints. Denies fever, chills, cp, palpitations, sob, nv, heat/cold intolerance, dizziness, melena, hematochezia, muscle weakness, loss of sensation, bowel/bladder incontinence or calf pain.\par

## 2022-08-18 NOTE — ASSESSMENT
[FreeTextEntry1] : Annual physical: f/u routine labwork\par CAD/atrial tachycardia/palpitations: no cp/palpitations, c/w aspirin/statin therapy, provided nutritional counseling, recommend low fat diet, low salt diet, c/w cardizem, TTE 8/2020, EF 70-75%, f/u cardiology\par Abnormal xray: exapansion of distal fem met with lucencies of tibia, cortical thinning, s/p euflexxa, , f/u orthopedist\par Left knee arthritis: c/w cane, recommend low intensity exercise/stretching, declined PT, f/u orthopedist\par Balance disorder/hx of CVA: unclear hx of cva, residual balance disorder, recommend f/u with neurology\par Seizure disorder/Intracranial tumor: childhood tumor resected, c/w phenytoin, phenobarbital, f/u neurology\par Claudication/pvd: c/w aspirin/statin therapy, f/u cardiology\par COPD: c/w albuterol hfa prn for sob/wheezing, c/w trelegy as prescribed, f/u pulmnology\par Lung mass: 3.4cm mass seen on CT, s/p PET, f/u pulmonology, CTSx recommended continued surveillance, f/u CT surgery\par macrocytosis: f/u repeat CBC/b12/folate\par Screen for lung CA: pt to follow up with pulmnologist for repeat CT for lung mass/screening for lung CA\par RTC 2 wks

## 2022-08-25 LAB
25(OH)D3 SERPL-MCNC: 39.6 NG/ML
ALBUMIN SERPL ELPH-MCNC: 4.9 G/DL
ALP BLD-CCNC: 163 U/L
ALT SERPL-CCNC: 14 U/L
ANION GAP SERPL CALC-SCNC: 17 MMOL/L
APPEARANCE: CLEAR
AST SERPL-CCNC: 18 U/L
BACTERIA: NEGATIVE
BASOPHILS # BLD AUTO: 0.05 K/UL
BASOPHILS NFR BLD AUTO: 0.7 %
BILIRUB SERPL-MCNC: 0.3 MG/DL
BILIRUBIN URINE: NEGATIVE
BLOOD URINE: NEGATIVE
BUN SERPL-MCNC: 15 MG/DL
CALCIUM SERPL-MCNC: 9.8 MG/DL
CHLORIDE SERPL-SCNC: 106 MMOL/L
CHOLEST SERPL-MCNC: 182 MG/DL
CO2 SERPL-SCNC: 18 MMOL/L
COLOR: NORMAL
CREAT SERPL-MCNC: 0.91 MG/DL
EGFR: 90 ML/MIN/1.73M2
EOSINOPHIL # BLD AUTO: 0.2 K/UL
EOSINOPHIL NFR BLD AUTO: 2.9 %
ESTIMATED AVERAGE GLUCOSE: 111 MG/DL
FOLATE SERPL-MCNC: 11.7 NG/ML
GLUCOSE QUALITATIVE U: NEGATIVE
GLUCOSE SERPL-MCNC: 100 MG/DL
HBA1C MFR BLD HPLC: 5.5 %
HCT VFR BLD CALC: 44.2 %
HDLC SERPL-MCNC: 79 MG/DL
HGB BLD-MCNC: 14 G/DL
HYALINE CASTS: 0 /LPF
IMM GRANULOCYTES NFR BLD AUTO: 0.6 %
KETONES URINE: NEGATIVE
LDLC SERPL CALC-MCNC: 79 MG/DL
LEUKOCYTE ESTERASE URINE: NEGATIVE
LYMPHOCYTES # BLD AUTO: 2.65 K/UL
LYMPHOCYTES NFR BLD AUTO: 37.8 %
MAN DIFF?: NORMAL
MCHC RBC-ENTMCNC: 31.7 GM/DL
MCHC RBC-ENTMCNC: 33.7 PG
MCV RBC AUTO: 106.3 FL
MICROSCOPIC-UA: NORMAL
MONOCYTES # BLD AUTO: 0.67 K/UL
MONOCYTES NFR BLD AUTO: 9.6 %
NEUTROPHILS # BLD AUTO: 3.4 K/UL
NEUTROPHILS NFR BLD AUTO: 48.4 %
NITRITE URINE: NEGATIVE
NONHDLC SERPL-MCNC: 103 MG/DL
PH URINE: 6
PLATELET # BLD AUTO: 251 K/UL
POTASSIUM SERPL-SCNC: 4.5 MMOL/L
PROT SERPL-MCNC: 7.5 G/DL
PROTEIN URINE: NORMAL
RBC # BLD: 4.16 M/UL
RBC # FLD: 14.6 %
RED BLOOD CELLS URINE: 0 /HPF
SODIUM SERPL-SCNC: 140 MMOL/L
SPECIFIC GRAVITY URINE: 1.02
SQUAMOUS EPITHELIAL CELLS: 0 /HPF
TRIGL SERPL-MCNC: 121 MG/DL
TSH SERPL-ACNC: 0.87 UIU/ML
UROBILINOGEN URINE: NORMAL
VIT B12 SERPL-MCNC: 488 PG/ML
WBC # FLD AUTO: 7.01 K/UL
WHITE BLOOD CELLS URINE: 1 /HPF

## 2022-09-28 ENCOUNTER — APPOINTMENT (OUTPATIENT)
Dept: NEUROLOGY | Facility: CLINIC | Age: 72
End: 2022-09-28

## 2022-09-28 PROCEDURE — 99213 OFFICE O/P EST LOW 20 MIN: CPT

## 2022-09-28 NOTE — ASSESSMENT
[FreeTextEntry1] : This is a 71 year-old man with a history of brain tumor resection as a child.\par \par He remains on Dilantin and phenobarbital. \par I will continue his current doses.\par \par He follows with a cardiologist.\par \par I will see him back in 6 months.

## 2022-09-28 NOTE — HISTORY OF PRESENT ILLNESS
[FreeTextEntry1] : I saw this patient in the office today.\par \par As you recall, he described a history of brain tumor resection as a child. He has been on antiseizure medications ever since then.\par He is currently on Dilantin 200 mg twice per day, and phenobarbital 64.8 mg twice per day. He takes folic acid supplementation.\par \par He is somewhat unclear as to when his last seizure was.\par The patient does not drive.\par He had seen a neurologist previously but reported that he had to switch doctors due to the fact that this previous neurologist did not take his insurance.\par \par He is also status post hospitalization for heart attack.

## 2022-09-28 NOTE — DATA REVIEWED
[de-identified] : CT scan of the head performed on 10/1/16 was reviewed. This study demonstrated only chronic postoperative changes graded there was no enhancement to suggest recurrent tumor.

## 2022-10-19 ENCOUNTER — APPOINTMENT (OUTPATIENT)
Dept: FAMILY MEDICINE | Facility: CLINIC | Age: 72
End: 2022-10-19

## 2022-10-19 VITALS
BODY MASS INDEX: 27.63 KG/M2 | SYSTOLIC BLOOD PRESSURE: 126 MMHG | DIASTOLIC BLOOD PRESSURE: 84 MMHG | WEIGHT: 193 LBS | TEMPERATURE: 97.9 F | HEART RATE: 87 BPM | OXYGEN SATURATION: 96 % | HEIGHT: 70 IN

## 2022-10-19 DIAGNOSIS — M54.50 LOW BACK PAIN, UNSPECIFIED: ICD-10-CM

## 2022-10-19 PROCEDURE — G0008: CPT

## 2022-10-19 PROCEDURE — 99214 OFFICE O/P EST MOD 30 MIN: CPT

## 2022-10-19 PROCEDURE — 90662 IIV NO PRSV INCREASED AG IM: CPT

## 2022-10-19 NOTE — HISTORY OF PRESENT ILLNESS
[FreeTextEntry1] : Follow Up 2M and Flu Shot  [de-identified] : 70 yo male presents for follow up of low back pain. he says it has improved significantly. he is feeling better. Denies fever, chills, cp, palpitations, sob, nv, heat/cold intolerance, dizziness, melena, hematochezia, muscle weakness, loss of sensation, bowel/bladder incontinence or calf pain.\par

## 2022-10-19 NOTE — ASSESSMENT
[FreeTextEntry1] : Low back pain: significantly improved, recommend low intensity exercise/stretching, will ct monitor, f/u orthopedist\par Mild COPD: c/w trelegy, albuterol hfa prn for sob/wheezing, advised to go to ER if condition isn't resolved with hfa, f/u pulm\par Seizure disorder: c/w current regimen, reports that last seizure was 25 yrs ago, f/u neurology\par RTC 4 wks

## 2022-12-02 ENCOUNTER — RX RENEWAL (OUTPATIENT)
Age: 72
End: 2022-12-02

## 2023-01-19 ENCOUNTER — APPOINTMENT (OUTPATIENT)
Dept: FAMILY MEDICINE | Facility: CLINIC | Age: 73
End: 2023-01-19

## 2023-01-26 ENCOUNTER — RX RENEWAL (OUTPATIENT)
Age: 73
End: 2023-01-26

## 2023-03-17 NOTE — ED ADULT NURSE NOTE - DISTAL EXTREMITY COLOR
Airway  Performed by: Pau Langston MD  Authorized by: Pau Langston MD     Final Airway Type:  Supraglottic airway  Final Supraglottic Airway:  Flexible  SGA Size*:  2  Attempts*:  1  Number of Other Approaches Attempted:  0   Patient Identified, Procedure confirmed, Emergency equipment available and Safety protocols followed  Location:  OR  Urgency:  Elective  Difficult Airway: No    Indications for Airway Management:  Anesthesia  Sedation Level:  Deep  Mask Difficulty Assessment:  1 - vent by mask  Performed By:  Anesthesiologist  Anesthesiologist:  Pau Langston MD     color consistent with ethnicity/race

## 2023-03-23 ENCOUNTER — RX RENEWAL (OUTPATIENT)
Age: 73
End: 2023-03-23

## 2023-03-29 ENCOUNTER — APPOINTMENT (OUTPATIENT)
Dept: NEUROLOGY | Facility: CLINIC | Age: 73
End: 2023-03-29

## 2023-03-30 ENCOUNTER — APPOINTMENT (OUTPATIENT)
Dept: ORTHOPEDIC SURGERY | Facility: CLINIC | Age: 73
End: 2023-03-30
Payer: MEDICARE

## 2023-03-30 VITALS
WEIGHT: 193 LBS | SYSTOLIC BLOOD PRESSURE: 147 MMHG | HEIGHT: 70 IN | HEART RATE: 82 BPM | DIASTOLIC BLOOD PRESSURE: 96 MMHG | BODY MASS INDEX: 27.63 KG/M2

## 2023-03-30 DIAGNOSIS — M17.12 UNILATERAL PRIMARY OSTEOARTHRITIS, LEFT KNEE: ICD-10-CM

## 2023-03-30 PROCEDURE — 73564 X-RAY EXAM KNEE 4 OR MORE: CPT | Mod: LT

## 2023-03-30 PROCEDURE — 20610 DRAIN/INJ JOINT/BURSA W/O US: CPT | Mod: LT

## 2023-03-30 PROCEDURE — 99214 OFFICE O/P EST MOD 30 MIN: CPT | Mod: 25

## 2023-03-30 NOTE — DISCUSSION/SUMMARY
[Medication Risks Reviewed] : Medication risks reviewed [Surgical risks reviewed] : Surgical risks reviewed [de-identified] : Patient is a 72-year-old male with left knee arthritis exacerbation presenting today for initial evaluation.  He had good response to conservative treatment in the past like to continue with that.  I gave him an injection of cortisone which she tolerated well.  I have recommended he perform low impact activity and exercises.  We will possibly continue viscosupplementation in the future.  All questions were asked and answered.  I will see him back in 6 weeks for repeat evaluation

## 2023-03-30 NOTE — PROCEDURE
[de-identified] : I injected his left knee. I discussed at length with the patient the planned steroid and lidocaine injection. The risks, benefits, convalescence and alternatives were reviewed. The possible side effects discussed included but were not limited to: pain, swelling, heat, bleeding, and redness. Symptoms are generally mild but if they are extensive then contact the office. Giving pain relievers by mouth such as NSAIDs or Tylenol can generally treat the reactions to steroid and lidocaine. Rare cases of infection have been noted. Rash, hives and itching may occur post injection. If you have muscle pain or cramps, flushing and or swelling of the face, rapid heart beat, nausea, dizziness, fever, chills, headache, difficulty breathing, swelling in the arms or legs, or have a prickly feeling of your skin, contact a health care provider immediately. Following this discussion, the knee was prepped with Alcohol and under sterile condition the 80 mg Depo-Medrol and 6 cc Lidocaine injection was performed with a 20 gauge needle through a superolateral injection site. The needle was introduced into the joint, aspiration was performed to ensure intra-articular placement and the medication was injected. Upon withdrawal of the needle the site was cleaned with alcohol and a band aid applied. The patient tolerated the injection well and there were no adverse effects. Post injection instructions included no strenuous activity for 24 hours, cryotherapy and if there are any adverse effects to contact the office.

## 2023-03-30 NOTE — PHYSICAL EXAM
[de-identified] : Musculoskeletal:. Left knee exam shows mild effusion, ROM is 0-1 30 degrees, no instability, no pain with Felisa, medial and lateral joint line tenderness. There are well-healed surgical scars present throughout the anterior aspect of the knee.\par 5/5 motor strength in bilateral lower extremities. Sensory: Intact in bilateral lower extremities. DTRs: Biceps, brachioradialis, triceps, patellar, ankle and plantar 2+ and symmetric bilaterally. Pulses: dorsalis pedis, posterior tibial, femoral, popliteal, and radial 2+ and symmetric bilaterally. \par Constitutional: Alert and in no acute distress, but well-appearing.  [de-identified] : 4 views left knee obtained the office today show no acute fracture or dislocation.  Tricompartmental degenerative changes are noted.  There are bone deformities noted in the femur with no aggressive osseous lesions.

## 2023-03-30 NOTE — HISTORY OF PRESENT ILLNESS
[de-identified] : Patient is a 72-year-old male here today for evaluation of left knee pain.  He states he had a good response to the gel injections that he received approximately 1-1/2 years ago.  States that then 1 week ago he fell and is now having increasing pain in the left knee.  Did not hurt him the day fell however the last few days has been getting more painful.  Hurts over the medial and posterior aspects of the knee.  Is able to ambulate.  Has some swelling in the knee.  Denies any ecchymosis.  Denies any hip pain or neurovascular compromise.  Is unable to take anti-inflammatories but has been taking Tylenol

## 2023-04-12 ENCOUNTER — APPOINTMENT (OUTPATIENT)
Dept: CARDIOLOGY | Facility: CLINIC | Age: 73
End: 2023-04-12
Payer: MEDICARE

## 2023-04-12 ENCOUNTER — NON-APPOINTMENT (OUTPATIENT)
Age: 73
End: 2023-04-12

## 2023-04-12 VITALS
TEMPERATURE: 97.9 F | HEIGHT: 70 IN | SYSTOLIC BLOOD PRESSURE: 139 MMHG | BODY MASS INDEX: 27.63 KG/M2 | OXYGEN SATURATION: 92 % | DIASTOLIC BLOOD PRESSURE: 92 MMHG | WEIGHT: 193 LBS | HEART RATE: 87 BPM

## 2023-04-12 DIAGNOSIS — I65.23 OCCLUSION AND STENOSIS OF BILATERAL CAROTID ARTERIES: ICD-10-CM

## 2023-04-12 DIAGNOSIS — I73.9 PERIPHERAL VASCULAR DISEASE, UNSPECIFIED: ICD-10-CM

## 2023-04-12 DIAGNOSIS — I47.1 SUPRAVENTRICULAR TACHYCARDIA: ICD-10-CM

## 2023-04-12 PROCEDURE — 99215 OFFICE O/P EST HI 40 MIN: CPT | Mod: 25

## 2023-04-12 PROCEDURE — 93000 ELECTROCARDIOGRAM COMPLETE: CPT

## 2023-04-12 NOTE — HISTORY OF PRESENT ILLNESS
[FreeTextEntry1] : atrial tachycardia. , dyspnea on exertion and COPD , smoking addiction\par \par HPI for today:  he gets really out of breathe. when he is rushing.  he uses albutearol. and it "sucks" . \par he does not like this particular albuterol.  he has difficult time synchronizting the albuterol HFA\par he complains of that his voice is raspy.  and not proper. \par \par \par old note:  : he feels ok. he has not been taking cardizem.   he denies new symptoms. intermitten palpitaitons.  quit mokimng.   he feels dizziness. and klightheadness.  no syncope.\par \par \par \par old note:  patient admitted to hospital on 1 aug 2020 for confusiona and shortness of breathe and altered .  he had hiccups, he was hypnatremic and he had infection  and fever. he recocvered.  cardiologuy was consutlted for tachycardia and atrial tachyarrhythmias.\par It mentions on the discharge that he had atrial fibrillation. he is on cardizem but not anticoagulation \par no headacehs. no dizziness\par Ct scan reviewed and shows calcification of LAD.\par he is a smoker. Smoked 1 pack a day >40 years. (atat age 16 he started)  \par + numbness of the legs blateall,. and pain in the legs bilaterally.  \par \par \par \par 68 y/o M with PMHX 3.4cm L Lung Mass (known, refusing workup/surgical eval/biopsy), Hx Intracranial Tumor s/p L Frontal Craniotomy, Seizure Disorder, COPD/Emphysema, Tobacco Abuse/Active Smoker was brought to [facility] by his wife for evaluation of Hiccups x1 week, AMS, and Fever and was subsequently BIBEMS to [facility] ER for further evaluation. Hypoxia noted by EMS with O2sat 89% RA at rest improving to 99% on 6L NC. Fever Tmax 103.4F noted in Triage. +Associated Chills. +Nonproductive Cough/No sputum. Also c/o acute on chronic LE pain (hx LE trauma as a child). +Fatigue, +Gen Weakness \par \par .Pt noted to have significant hyponatremia on labs with Na 122. CXR  Hyperinflated with RLL infiltrates. CTA Chest with emphysema, 3.4cm Left Lung Apical Mass, multiple other scar-like densities, unchanged from prior imaging. Negative for PE. +new GGO RLL and +R Hilar LAD \par  LE Dopplers negative for DVT (LE pain is chronic per patient). CT head for AMS shows post-surgical changes from L Craniotomy otherwise no acute findings. \par \par \par \par

## 2023-04-12 NOTE — DISCUSSION/SUMMARY
[Patient] : the patient [Risks] : risks [Benefits] : benefits [Alternatives] : alternatives [With Me] : with me [___ Month(s)] : in [unfilled] month(s) [EKG obtained to assist in diagnosis and management of assessed problem(s)] : EKG obtained to assist in diagnosis and management of assessed problem(s) [FreeTextEntry1] : This is a 72  year old male with hsitory ofsmoking , COPD emphysema, with atrial tachya rrhythmia with coronary calcifications and dyspnea on exertion \par 1) dyspnea on exertion and coronary caclfications : repeat stress test and transthoracic echocardiogram \par 2) atrial tachyarrhyhtmia:  Likley atrial tachcyardia. due to COPD. not on  anticoagulation ct  cardizem 30 mg Q12 ( as pts heart rate 60-70). no anticoagulation .  \par 3) AAA screening:  significant athersclerosis. no aneurysm.  \par 4) severe athersclersois: Peripheral vascular disease : aspirin statins. \par 5Z) cartoid athersclerosis: repeat carotidUS/ \par \par \par

## 2023-04-12 NOTE — CARDIOLOGY SUMMARY
[___] : [unfilled] [LVEF ___%] : LVEF [unfilled]% [Normal] : normal LA size [None] : no mitral regurgitation [de-identified] : 4 12 2023\par  Sinus  Rhythm \par -Nonspecific ST depression  -Nondiagnostic. \par ABNORMAL \par  [de-identified] : sept 2020: 1 mths :  PSVT. no definite atrial fibrillation  [de-identified] : MEREDITH : sept 2020 NOrmal Study at rest and provocation. \par \par US arterial Duplex: 9/2020:" severe athersclerosis .  20-49 % CFA on left side. \par US arota: MOderate athersclerosis no aneurysm. no stenosis.

## 2023-05-04 ENCOUNTER — APPOINTMENT (OUTPATIENT)
Dept: CARDIOLOGY | Facility: CLINIC | Age: 73
End: 2023-05-04

## 2023-05-18 ENCOUNTER — APPOINTMENT (OUTPATIENT)
Dept: CARDIOLOGY | Facility: CLINIC | Age: 73
End: 2023-05-18

## 2023-06-15 ENCOUNTER — RX RENEWAL (OUTPATIENT)
Age: 73
End: 2023-06-15

## 2023-07-18 ENCOUNTER — RX RENEWAL (OUTPATIENT)
Age: 73
End: 2023-07-18

## 2023-08-21 ENCOUNTER — LABORATORY RESULT (OUTPATIENT)
Age: 73
End: 2023-08-21

## 2023-08-21 ENCOUNTER — APPOINTMENT (OUTPATIENT)
Dept: FAMILY MEDICINE | Facility: CLINIC | Age: 73
End: 2023-08-21
Payer: MEDICARE

## 2023-08-21 VITALS
HEART RATE: 88 BPM | WEIGHT: 191 LBS | BODY MASS INDEX: 27.35 KG/M2 | HEIGHT: 70 IN | OXYGEN SATURATION: 94 % | SYSTOLIC BLOOD PRESSURE: 132 MMHG | DIASTOLIC BLOOD PRESSURE: 84 MMHG

## 2023-08-21 DIAGNOSIS — I25.10 ATHEROSCLEROTIC HEART DISEASE OF NATIVE CORONARY ARTERY W/OUT ANGINA PECTORIS: ICD-10-CM

## 2023-08-21 DIAGNOSIS — R06.02 SHORTNESS OF BREATH: ICD-10-CM

## 2023-08-21 DIAGNOSIS — I73.9 PERIPHERAL VASCULAR DISEASE, UNSPECIFIED: ICD-10-CM

## 2023-08-21 DIAGNOSIS — J44.9 CHRONIC OBSTRUCTIVE PULMONARY DISEASE, UNSPECIFIED: ICD-10-CM

## 2023-08-21 DIAGNOSIS — R00.2 PALPITATIONS: ICD-10-CM

## 2023-08-21 DIAGNOSIS — Z12.5 ENCOUNTER FOR SCREENING FOR MALIGNANT NEOPLASM OF PROSTATE: ICD-10-CM

## 2023-08-21 DIAGNOSIS — Z13.220 ENCOUNTER FOR SCREENING FOR LIPOID DISORDERS: ICD-10-CM

## 2023-08-21 DIAGNOSIS — F17.200 NICOTINE DEPENDENCE, UNSPECIFIED, UNCOMPLICATED: ICD-10-CM

## 2023-08-21 DIAGNOSIS — D49.6 NEOPLASM OF UNSPECIFIED BEHAVIOR OF BRAIN: ICD-10-CM

## 2023-08-21 DIAGNOSIS — Z86.73 PERSONAL HISTORY OF TRANSIENT ISCHEMIC ATTACK (TIA), AND CEREBRAL INFARCTION W/OUT RESIDUAL DEFICITS: ICD-10-CM

## 2023-08-21 DIAGNOSIS — Z13.29 ENCOUNTER FOR SCREENING FOR OTHER SUSPECTED ENDOCRINE DISORDER: ICD-10-CM

## 2023-08-21 DIAGNOSIS — Z13.1 ENCOUNTER FOR SCREENING FOR DIABETES MELLITUS: ICD-10-CM

## 2023-08-21 DIAGNOSIS — Z00.00 ENCOUNTER FOR GENERAL ADULT MEDICAL EXAMINATION W/OUT ABNORMAL FINDINGS: ICD-10-CM

## 2023-08-21 DIAGNOSIS — R93.7 ABNORMAL FINDINGS ON DIAGNOSTIC IMAGING OF OTHER PARTS OF MUSCULOSKELETAL SYSTEM: ICD-10-CM

## 2023-08-21 DIAGNOSIS — R91.8 OTHER NONSPECIFIC ABNORMAL FINDING OF LUNG FIELD: ICD-10-CM

## 2023-08-21 PROCEDURE — 99397 PER PM REEVAL EST PAT 65+ YR: CPT | Mod: 25

## 2023-08-21 PROCEDURE — G0444 DEPRESSION SCREEN ANNUAL: CPT | Mod: 59

## 2023-08-21 RX ORDER — FLUTICASONE FUROATE, UMECLIDINIUM BROMIDE AND VILANTEROL TRIFENATATE 100; 62.5; 25 UG/1; UG/1; UG/1
100-62.5-25 POWDER RESPIRATORY (INHALATION) DAILY
Qty: 1 | Refills: 5 | Status: DISCONTINUED | COMMUNITY
Start: 2021-04-15 | End: 2023-08-21

## 2023-08-21 NOTE — ASSESSMENT
[FreeTextEntry1] : Annual physical: f/u routine labwork Screen for prostate CA: f/u psa Knee arthritis: significantly improved, recommend low-mod int exercise, f/u orthopedist Atrial tachycardia/palpitations/claudication/CAD/carotid atherosclerosis/pvd: c/w current regimen including asa/statin/ccb, f/u cardiology Abnormality femur/tibia: expansion/lucency/cortical thinning on xray, f/u orthopedist Intracranial tumor: resection in childhood, c/w antiseizure medications, last seizure 30 yrs ago, f/u neurology Hx of cva: c/w asa/statin, no residual deficits, f/u neurology Lung mass: surveillance per CT surgery Mild COPD: c/w trelegy as prescribed, c/w albuterol hfa prn for sob/wheezing, advised to go to ER if condition is not resolved with hfa, f/u pulm Hx of smoking: declined CT lung screening AAA screening: negative for AAA on US 2020 BMI: Recommend low fat diet, low int exercise, and DASH diet RTC 3 wks

## 2023-08-21 NOTE — HISTORY OF PRESENT ILLNESS
[FreeTextEntry1] : CHAGO [de-identified] : 71 yo male presents for annual physical. Reports feeling well. Notes significant improvement in left knee pain, he saw orthopedist and had cortisone injection. Denies fever, chills, cp, palpitations, sob, nvcd.

## 2023-08-21 NOTE — HEALTH RISK ASSESSMENT
[Good] : ~his/her~  mood as  good [1 or 2 (0 pts)] : 1 or 2 (0 points) [Never (0 pts)] : Never (0 points) [No] : In the past 12 months have you used drugs other than those required for medical reasons? No [No falls in past year] : Patient reported no falls in the past year [0] : 2) Feeling down, depressed, or hopeless: Not at all (0) [PHQ-2 Negative - No further assessment needed] : PHQ-2 Negative - No further assessment needed [Audit-CScore] : 0 [de-identified] : needs improvement [de-identified] : needs improvement [[id number]] : 0 [Patient declined colonoscopy] : Patient declined colonoscopy [HIV test declined] : HIV test declined [Hepatitis C test declined] : Hepatitis C test declined [With Family] : lives with family [Retired] : retired [] :  [Sexually Active] : sexually active [High Risk Behavior] : no high risk behavior [Feels Safe at Home] : Feels safe at home [Fully functional (bathing, dressing, toileting, transferring, walking, feeding)] : Fully functional (bathing, dressing, toileting, transferring, walking, feeding) [Fully functional (using the telephone, shopping, preparing meals, housekeeping, doing laundry, using] : Fully functional and needs no help or supervision to perform IADLs (using the telephone, shopping, preparing meals, housekeeping, doing laundry, using transportation, managing medications and managing finances) [Reports changes in hearing] : Reports no changes in hearing [Reports changes in vision] : Reports no changes in vision [Reports changes in dental health] : Reports no changes in dental health [Former] : Former [20 or more] : 20 or more [< 15 Years] : < 15 Years [de-identified] : 50 yrs 1 ppd, quit 3 yrs ago

## 2023-08-31 DIAGNOSIS — R79.89 OTHER SPECIFIED ABNORMAL FINDINGS OF BLOOD CHEMISTRY: ICD-10-CM

## 2023-08-31 DIAGNOSIS — R74.8 ABNORMAL LEVELS OF OTHER SERUM ENZYMES: ICD-10-CM

## 2023-08-31 LAB
25(OH)D3 SERPL-MCNC: 39.2 NG/ML
ALBUMIN SERPL ELPH-MCNC: 4.9 G/DL
ALP BLD-CCNC: 201 U/L
ALT SERPL-CCNC: 27 U/L
ANION GAP SERPL CALC-SCNC: 16 MMOL/L
APPEARANCE: CLEAR
AST SERPL-CCNC: 26 U/L
BACTERIA: NEGATIVE /HPF
BILIRUB SERPL-MCNC: 0.4 MG/DL
BILIRUBIN URINE: NEGATIVE
BLOOD URINE: NEGATIVE
BUN SERPL-MCNC: 22 MG/DL
CALCIUM SERPL-MCNC: 9.8 MG/DL
CAST: 39 /LPF
CHLORIDE SERPL-SCNC: 107 MMOL/L
CHOLEST SERPL-MCNC: 173 MG/DL
CO2 SERPL-SCNC: 19 MMOL/L
COLOR: NORMAL
CREAT SERPL-MCNC: 1.4 MG/DL
EGFR: 53 ML/MIN/1.73M2
EPITHELIAL CELLS: 4 /HPF
ESTIMATED AVERAGE GLUCOSE: 111 MG/DL
GLUCOSE QUALITATIVE U: NEGATIVE MG/DL
GLUCOSE SERPL-MCNC: 116 MG/DL
HBA1C MFR BLD HPLC: 5.5 %
HDLC SERPL-MCNC: 77 MG/DL
HYALINE CASTS: PRESENT
KETONES URINE: ABNORMAL MG/DL
LDLC SERPL CALC-MCNC: 75 MG/DL
LEUKOCYTE ESTERASE URINE: NEGATIVE
MICROSCOPIC-UA: NORMAL
MUCUS: PRESENT
NITRITE URINE: NEGATIVE
NONHDLC SERPL-MCNC: 96 MG/DL
PH URINE: 5.5
POTASSIUM SERPL-SCNC: 4.6 MMOL/L
PROT SERPL-MCNC: 7.8 G/DL
PROTEIN URINE: 30 MG/DL
PSA FREE FLD-MCNC: 32 %
PSA FREE SERPL-MCNC: 1.36 NG/ML
PSA SERPL-MCNC: 4.2 NG/ML
RED BLOOD CELLS URINE: 1 /HPF
REVIEW: NORMAL
SODIUM SERPL-SCNC: 142 MMOL/L
SPECIFIC GRAVITY URINE: 1.02
TRIGL SERPL-MCNC: 120 MG/DL
TSH SERPL-ACNC: 1.41 UIU/ML
UROBILINOGEN URINE: 0.2 MG/DL
WHITE BLOOD CELLS URINE: 1 /HPF

## 2023-09-22 ENCOUNTER — APPOINTMENT (OUTPATIENT)
Dept: FAMILY MEDICINE | Facility: CLINIC | Age: 73
End: 2023-09-22
Payer: MEDICARE

## 2023-09-22 PROCEDURE — 36415 COLL VENOUS BLD VENIPUNCTURE: CPT

## 2023-10-18 ENCOUNTER — APPOINTMENT (OUTPATIENT)
Dept: CARDIOLOGY | Facility: CLINIC | Age: 73
End: 2023-10-18

## 2023-11-07 LAB
5NT SERPL-CCNC: 4 IU/L
ALP BLD-CCNC: 181 IU/L
ALP BONE CFR SERPL: 30 %
ALP INTEST CFR SERPL: 13 %
ALP LIVER CFR SERPL: 57 %
ANION GAP SERPL CALC-SCNC: 12 MMOL/L
APPEARANCE: CLEAR
BACTERIA: NEGATIVE /HPF
BASOPHILS # BLD AUTO: 0.04 K/UL
BASOPHILS NFR BLD AUTO: 0.6 %
BILIRUBIN URINE: NEGATIVE
BLOOD URINE: NEGATIVE
BUN SERPL-MCNC: 21 MG/DL
CALCIUM SERPL-MCNC: 9.6 MG/DL
CAST: 0 /LPF
CHLORIDE SERPL-SCNC: 109 MMOL/L
CO2 SERPL-SCNC: 22 MMOL/L
COLOR: YELLOW
CREAT SERPL-MCNC: 0.99 MG/DL
EGFR: 81 ML/MIN/1.73M2
EOSINOPHIL # BLD AUTO: 0.16 K/UL
EOSINOPHIL NFR BLD AUTO: 2.4 %
EPITHELIAL CELLS: 0 /HPF
FOLATE SERPL-MCNC: 18.9 NG/ML
GGT SERPL-CCNC: 155 U/L
GLUCOSE QUALITATIVE U: NEGATIVE MG/DL
GLUCOSE SERPL-MCNC: 103 MG/DL
HCT VFR BLD CALC: 44 %
HGB BLD-MCNC: 13.9 G/DL
IMM GRANULOCYTES NFR BLD AUTO: 0.6 %
KETONES URINE: NEGATIVE MG/DL
LEUKOCYTE ESTERASE URINE: NEGATIVE
LYMPHOCYTES # BLD AUTO: 2.29 K/UL
LYMPHOCYTES NFR BLD AUTO: 34.7 %
MAN DIFF?: NORMAL
MCHC RBC-ENTMCNC: 31.6 GM/DL
MCHC RBC-ENTMCNC: 34.8 PG
MCV RBC AUTO: 110 FL
MICROSCOPIC-UA: NORMAL
MONOCYTES # BLD AUTO: 0.68 K/UL
MONOCYTES NFR BLD AUTO: 10.3 %
NEUTROPHILS # BLD AUTO: 3.39 K/UL
NEUTROPHILS NFR BLD AUTO: 51.4 %
NITRITE URINE: NEGATIVE
PH URINE: 6
PLATELET # BLD AUTO: 217 K/UL
POTASSIUM SERPL-SCNC: 4.7 MMOL/L
PROTEIN URINE: 30 MG/DL
RBC # BLD: 4 M/UL
RBC # FLD: 14.5 %
RED BLOOD CELLS URINE: 0 /HPF
SODIUM SERPL-SCNC: 143 MMOL/L
SPECIFIC GRAVITY URINE: 1.03
UROBILINOGEN URINE: 0.2 MG/DL
VIT B12 SERPL-MCNC: 330 PG/ML
WBC # FLD AUTO: 6.6 K/UL
WHITE BLOOD CELLS URINE: 0 /HPF

## 2023-11-22 ENCOUNTER — APPOINTMENT (OUTPATIENT)
Dept: FAMILY MEDICINE | Facility: CLINIC | Age: 73
End: 2023-11-22
Payer: MEDICARE

## 2023-11-22 ENCOUNTER — LABORATORY RESULT (OUTPATIENT)
Age: 73
End: 2023-11-22

## 2023-11-22 VITALS
HEART RATE: 88 BPM | TEMPERATURE: 97.7 F | HEIGHT: 70 IN | WEIGHT: 188 LBS | SYSTOLIC BLOOD PRESSURE: 122 MMHG | BODY MASS INDEX: 26.92 KG/M2 | DIASTOLIC BLOOD PRESSURE: 80 MMHG | OXYGEN SATURATION: 95 %

## 2023-11-22 DIAGNOSIS — R74.8 ABNORMAL LEVELS OF OTHER SERUM ENZYMES: ICD-10-CM

## 2023-11-22 DIAGNOSIS — G40.909 EPILEPSY, UNSPECIFIED, NOT INTRACTABLE, W/OUT STATUS EPILEPTICUS: ICD-10-CM

## 2023-11-22 DIAGNOSIS — D72.9 DISORDER OF WHITE BLOOD CELLS, UNSPECIFIED: ICD-10-CM

## 2023-11-22 DIAGNOSIS — D75.89 OTHER SPECIFIED DISEASES OF BLOOD AND BLOOD-FORMING ORGANS: ICD-10-CM

## 2023-11-22 DIAGNOSIS — R97.20 ELEVATED PROSTATE, SPECIFIC ANTIGEN [PSA]: ICD-10-CM

## 2023-11-22 DIAGNOSIS — R82.4 ACETONURIA: ICD-10-CM

## 2023-11-22 PROCEDURE — 99214 OFFICE O/P EST MOD 30 MIN: CPT

## 2023-11-22 RX ORDER — ALBUTEROL SULFATE 90 UG/1
108 (90 BASE) INHALANT RESPIRATORY (INHALATION) EVERY 4 HOURS
Qty: 1 | Refills: 2 | Status: ACTIVE | COMMUNITY
Start: 2021-02-24 | End: 1900-01-01

## 2023-12-06 LAB
ALBUMIN SERPL ELPH-MCNC: 4.7 G/DL
ALP BLD-CCNC: 184 U/L
ALT SERPL-CCNC: 17 U/L
ANION GAP SERPL CALC-SCNC: 15 MMOL/L
AST SERPL-CCNC: 22 U/L
BASOPHILS # BLD AUTO: 0.04 K/UL
BASOPHILS NFR BLD AUTO: 0.6 %
BILIRUB SERPL-MCNC: 0.3 MG/DL
BUN SERPL-MCNC: 16 MG/DL
CALCIUM SERPL-MCNC: 9.4 MG/DL
CHLORIDE SERPL-SCNC: 103 MMOL/L
CO2 SERPL-SCNC: 20 MMOL/L
COPPER SERPL-MCNC: 158 UG/DL
CREAT SERPL-MCNC: 1.06 MG/DL
EGFR: 75 ML/MIN/1.73M2
EOSINOPHIL # BLD AUTO: 0.18 K/UL
EOSINOPHIL NFR BLD AUTO: 2.9 %
GGT SERPL-CCNC: 145 U/L
GLUCOSE SERPL-MCNC: 97 MG/DL
HAPTOGLOB SERPL-MCNC: 114 MG/DL
HCT VFR BLD CALC: 43.9 %
HGB BLD-MCNC: 14.4 G/DL
HIV1+2 AB SPEC QL IA.RAPID: NONREACTIVE
IMM GRANULOCYTES NFR BLD AUTO: 0.5 %
LDH SERPL-CCNC: 244 U/L
LYMPHOCYTES # BLD AUTO: 1.99 K/UL
LYMPHOCYTES NFR BLD AUTO: 32.3 %
MAN DIFF?: NORMAL
MCHC RBC-ENTMCNC: 32.8 GM/DL
MCHC RBC-ENTMCNC: 35.3 PG
MCV RBC AUTO: 107.6 FL
MONOCYTES # BLD AUTO: 0.64 K/UL
MONOCYTES NFR BLD AUTO: 10.4 %
NEUTROPHILS # BLD AUTO: 3.28 K/UL
NEUTROPHILS NFR BLD AUTO: 53.3 %
PLATELET # BLD AUTO: 214 K/UL
POTASSIUM SERPL-SCNC: 4.8 MMOL/L
PROT SERPL-MCNC: 7.5 G/DL
RBC # BLD: 4.08 M/UL
RBC # BLD: 4.08 M/UL
RBC # FLD: 14 %
RETICS # AUTO: 1.8 %
RETICS AGGREG/RBC NFR: 71.4 K/UL
SODIUM SERPL-SCNC: 138 MMOL/L
WBC # FLD AUTO: 6.16 K/UL

## 2023-12-18 ENCOUNTER — RX RENEWAL (OUTPATIENT)
Age: 73
End: 2023-12-18

## 2024-01-07 ENCOUNTER — RX RENEWAL (OUTPATIENT)
Age: 74
End: 2024-01-07

## 2024-05-08 ENCOUNTER — RX RENEWAL (OUTPATIENT)
Age: 74
End: 2024-05-08

## 2024-05-08 RX ORDER — DILTIAZEM HYDROCHLORIDE 30 MG/1
30 TABLET ORAL
Qty: 90 | Refills: 3 | Status: ACTIVE | COMMUNITY
Start: 2020-11-17 | End: 1900-01-01

## 2024-06-14 ENCOUNTER — RX RENEWAL (OUTPATIENT)
Age: 74
End: 2024-06-14

## 2024-06-14 RX ORDER — ATORVASTATIN CALCIUM 40 MG/1
40 TABLET, FILM COATED ORAL
Qty: 90 | Refills: 0 | Status: ACTIVE | COMMUNITY
Start: 2021-11-12 | End: 1900-01-01

## 2024-06-17 ENCOUNTER — RX RENEWAL (OUTPATIENT)
Age: 74
End: 2024-06-17

## 2024-06-17 RX ORDER — FLUTICASONE FUROATE, UMECLIDINIUM BROMIDE AND VILANTEROL TRIFENATATE 100; 62.5; 25 UG/1; UG/1; UG/1
100-62.5-25 POWDER RESPIRATORY (INHALATION)
Qty: 60 | Refills: 2 | Status: ACTIVE | COMMUNITY
Start: 2023-03-23 | End: 1900-01-01

## 2024-06-25 RX ORDER — PHENOBARBITAL 64.8 MG/1
64.8 TABLET ORAL TWICE DAILY
Qty: 60 | Refills: 5 | Status: ACTIVE | COMMUNITY
Start: 2018-05-17 | End: 1900-01-01

## 2024-06-25 RX ORDER — PHENYTOIN SODIUM 100 MG/1
100 CAPSULE ORAL
Qty: 360 | Refills: 1 | Status: ACTIVE | COMMUNITY
Start: 2020-11-10 | End: 1900-01-01

## 2024-08-06 ENCOUNTER — RX RENEWAL (OUTPATIENT)
Age: 74
End: 2024-08-06

## 2024-08-27 ENCOUNTER — APPOINTMENT (OUTPATIENT)
Dept: CARDIOLOGY | Facility: CLINIC | Age: 74
End: 2024-08-27

## 2024-09-04 ENCOUNTER — APPOINTMENT (OUTPATIENT)
Dept: FAMILY MEDICINE | Facility: CLINIC | Age: 74
End: 2024-09-04
Payer: MEDICARE

## 2024-09-04 VITALS
OXYGEN SATURATION: 95 % | DIASTOLIC BLOOD PRESSURE: 70 MMHG | HEIGHT: 70 IN | WEIGHT: 182 LBS | BODY MASS INDEX: 26.05 KG/M2 | HEART RATE: 88 BPM | SYSTOLIC BLOOD PRESSURE: 110 MMHG

## 2024-09-04 DIAGNOSIS — I47.19 OTHER SUPRAVENTRICULAR TACHYCARDIA: ICD-10-CM

## 2024-09-04 DIAGNOSIS — G89.29 LOW BACK PAIN, UNSPECIFIED: ICD-10-CM

## 2024-09-04 DIAGNOSIS — R20.0 ANESTHESIA OF SKIN: ICD-10-CM

## 2024-09-04 DIAGNOSIS — Z13.1 ENCOUNTER FOR SCREENING FOR DIABETES MELLITUS: ICD-10-CM

## 2024-09-04 DIAGNOSIS — Z12.5 ENCOUNTER FOR SCREENING FOR MALIGNANT NEOPLASM OF PROSTATE: ICD-10-CM

## 2024-09-04 DIAGNOSIS — R91.8 OTHER NONSPECIFIC ABNORMAL FINDING OF LUNG FIELD: ICD-10-CM

## 2024-09-04 DIAGNOSIS — Z13.29 ENCOUNTER FOR SCREENING FOR OTHER SUSPECTED ENDOCRINE DISORDER: ICD-10-CM

## 2024-09-04 DIAGNOSIS — E66.3 OVERWEIGHT: ICD-10-CM

## 2024-09-04 DIAGNOSIS — Z87.898 PERSONAL HISTORY OF OTHER SPECIFIED CONDITIONS: ICD-10-CM

## 2024-09-04 DIAGNOSIS — I73.9 PERIPHERAL VASCULAR DISEASE, UNSPECIFIED: ICD-10-CM

## 2024-09-04 DIAGNOSIS — R79.89 OTHER SPECIFIED ABNORMAL FINDINGS OF BLOOD CHEMISTRY: ICD-10-CM

## 2024-09-04 DIAGNOSIS — Z00.00 ENCOUNTER FOR GENERAL ADULT MEDICAL EXAMINATION W/OUT ABNORMAL FINDINGS: ICD-10-CM

## 2024-09-04 DIAGNOSIS — Z13.220 ENCOUNTER FOR SCREENING FOR LIPOID DISORDERS: ICD-10-CM

## 2024-09-04 DIAGNOSIS — M54.50 LOW BACK PAIN, UNSPECIFIED: ICD-10-CM

## 2024-09-04 DIAGNOSIS — J44.9 CHRONIC OBSTRUCTIVE PULMONARY DISEASE, UNSPECIFIED: ICD-10-CM

## 2024-09-04 DIAGNOSIS — Z86.73 PERSONAL HISTORY OF TRANSIENT ISCHEMIC ATTACK (TIA), AND CEREBRAL INFARCTION W/OUT RESIDUAL DEFICITS: ICD-10-CM

## 2024-09-04 DIAGNOSIS — G40.909 EPILEPSY, UNSPECIFIED, NOT INTRACTABLE, W/OUT STATUS EPILEPTICUS: ICD-10-CM

## 2024-09-04 DIAGNOSIS — I25.10 ATHEROSCLEROTIC HEART DISEASE OF NATIVE CORONARY ARTERY W/OUT ANGINA PECTORIS: ICD-10-CM

## 2024-09-04 DIAGNOSIS — Z12.2 ENCOUNTER FOR SCREENING FOR MALIGNANT NEOPLASM OF RESPIRATORY ORGANS: ICD-10-CM

## 2024-09-04 DIAGNOSIS — D49.6 NEOPLASM OF UNSPECIFIED BEHAVIOR OF BRAIN: ICD-10-CM

## 2024-09-04 DIAGNOSIS — M89.9 DISORDER OF BONE, UNSPECIFIED: ICD-10-CM

## 2024-09-04 DIAGNOSIS — R00.2 PALPITATIONS: ICD-10-CM

## 2024-09-04 DIAGNOSIS — I65.23 OCCLUSION AND STENOSIS OF BILATERAL CAROTID ARTERIES: ICD-10-CM

## 2024-09-04 DIAGNOSIS — R97.20 ELEVATED PROSTATE, SPECIFIC ANTIGEN [PSA]: ICD-10-CM

## 2024-09-04 DIAGNOSIS — D75.89 OTHER SPECIFIED DISEASES OF BLOOD AND BLOOD-FORMING ORGANS: ICD-10-CM

## 2024-09-04 PROCEDURE — G0439: CPT

## 2024-09-04 NOTE — HEALTH RISK ASSESSMENT
[Good] : ~his/her~  mood as  good [1 or 2 (0 pts)] : 1 or 2 (0 points) [Never (0 pts)] : Never (0 points) [No] : In the past 12 months have you used drugs other than those required for medical reasons? No [Assistive Device] : Patient uses an assistive device [0] : 2) Feeling down, depressed, or hopeless: Not at all (0) [PHQ-2 Negative - No further assessment needed] : PHQ-2 Negative - No further assessment needed [Audit-CScore] : 0 [de-identified] : needs improvement [de-identified] : needs improvement [de-identified] : cane [[id number]] : 0 [Former] : Former [20 or more] : 20 or more [< 15 Years] : < 15 Years [de-identified] : 40 yrs 1 ppd, quit 3 yrs ago [Patient declined Low Dose CT Scan] : Patient declined Low Dose CT Scan [Patient declined colonoscopy] : Patient declined colonoscopy [HIV test declined] : HIV test declined [Hepatitis C test declined] : Hepatitis C test declined [With Family] : lives with family [Retired] : retired [] :  [Sexually Active] : sexually active [High Risk Behavior] : no high risk behavior [Fully functional (bathing, dressing, toileting, transferring, walking, feeding)] : Fully functional (bathing, dressing, toileting, transferring, walking, feeding) [Feels Safe at Home] : Feels safe at home [Fully functional (using the telephone, shopping, preparing meals, housekeeping, doing laundry, using] : Fully functional and needs no help or supervision to perform IADLs (using the telephone, shopping, preparing meals, housekeeping, doing laundry, using transportation, managing medications and managing finances) [Reports changes in hearing] : Reports no changes in hearing [Reports changes in vision] : Reports no changes in vision [Reports changes in dental health] : Reports no changes in dental health

## 2024-09-04 NOTE — ASSESSMENT
[FreeTextEntry1] : Annual physical: f/u routine labwork Screen for prostate CA: f/u psa Atrial tachycardia/palpitations/claudication/CAD/carotid atherosclerosis/pvd: c/w current regimen including asa/statin/ccb, f/u cardiology Abnormality femur/tibia: expansion/lucency/cortical thinning on xray, f/u orthopedist Intracranial tumor: resection in childhood, c/w antiseizure medications, last seizure 30 yrs ago, f/u neurology Hx of cva: c/w asa/statin, no residual deficits, f/u neurology Lung mass: surveillance per CT surgery Mild COPD: c/w trelegy as prescribed, c/w albuterol hfa prn for sob/wheezing, advised to go to ER if condition is not resolved with hfa, f/u pulm Hx of smoking: declined CT lung screening AAA screening: negative for AAA on US 2020 Overweight: Recommend low fat diet, low int exercise, and DASH diet Macrocytosis: f/u repeat CBC with folate/b12 level Screen for lung CA: declined low dose CT lung RTC 3 wks.

## 2024-09-04 NOTE — HISTORY OF PRESENT ILLNESS
[FreeTextEntry1] : CHAGO [de-identified] : 74 yo male presents for annual physical. Reports feeling well. Denies fever, chills, cp, palpitations, sob, nvcd.

## 2024-09-12 DIAGNOSIS — R73.03 PREDIABETES.: ICD-10-CM

## 2024-09-12 LAB
ALBUMIN SERPL ELPH-MCNC: 4 G/DL
ALP BLD-CCNC: 172 IU/L
ALP BLD-CCNC: 173 U/L
ALP BONE CFR SERPL: 32 %
ALP INTEST CFR SERPL: 5 %
ALP LIVER CFR SERPL: 63 %
ALT SERPL-CCNC: 23 U/L
ANION GAP SERPL CALC-SCNC: 16 MMOL/L
APPEARANCE: CLEAR
AST SERPL-CCNC: 32 U/L
BACTERIA: NEGATIVE /HPF
BASOPHILS # BLD AUTO: 0.02 K/UL
BASOPHILS NFR BLD AUTO: 0.3 %
BILIRUB SERPL-MCNC: 0.4 MG/DL
BILIRUBIN URINE: ABNORMAL
BLOOD URINE: NEGATIVE
BUN SERPL-MCNC: 19 MG/DL
CALCIUM SERPL-MCNC: 9.2 MG/DL
CAST: 0 /LPF
CHLORIDE SERPL-SCNC: 101 MMOL/L
CHOLEST SERPL-MCNC: 147 MG/DL
CO2 SERPL-SCNC: 22 MMOL/L
COLOR: NORMAL
CREAT SERPL-MCNC: 0.97 MG/DL
EGFR: 82 ML/MIN/1.73M2
EOSINOPHIL # BLD AUTO: 0.09 K/UL
EOSINOPHIL NFR BLD AUTO: 1.2 %
EPITHELIAL CELLS: 1 /HPF
ESTIMATED AVERAGE GLUCOSE: 120 MG/DL
FOLATE SERPL-MCNC: 13.8 NG/ML
GLUCOSE QUALITATIVE U: NEGATIVE MG/DL
GLUCOSE SERPL-MCNC: 106 MG/DL
HBA1C MFR BLD HPLC: 5.8 %
HCT VFR BLD CALC: 43.7 %
HDLC SERPL-MCNC: 48 MG/DL
HGB BLD-MCNC: 14.4 G/DL
IMM GRANULOCYTES NFR BLD AUTO: 0.8 %
KETONES URINE: NEGATIVE MG/DL
LDLC SERPL CALC-MCNC: 79 MG/DL
LEUKOCYTE ESTERASE URINE: NEGATIVE
LYMPHOCYTES # BLD AUTO: 1.84 K/UL
LYMPHOCYTES NFR BLD AUTO: 24.2 %
MAN DIFF?: NORMAL
MCHC RBC-ENTMCNC: 33 GM/DL
MCHC RBC-ENTMCNC: 34.6 PG
MCV RBC AUTO: 105 FL
MICROSCOPIC-UA: NORMAL
MONOCYTES # BLD AUTO: 0.7 K/UL
MONOCYTES NFR BLD AUTO: 9.2 %
NEUTROPHILS # BLD AUTO: 4.89 K/UL
NEUTROPHILS NFR BLD AUTO: 64.3 %
NITRITE URINE: NEGATIVE
NONHDLC SERPL-MCNC: 99 MG/DL
PH URINE: 6
PLATELET # BLD AUTO: 314 K/UL
POTASSIUM SERPL-SCNC: 3.9 MMOL/L
PROT SERPL-MCNC: 7.5 G/DL
PROTEIN URINE: 30 MG/DL
PSA FREE FLD-MCNC: 13 %
PSA FREE SERPL-MCNC: 0.81 NG/ML
PSA SERPL-MCNC: 6 NG/ML
RBC # BLD: 4.16 M/UL
RBC # FLD: 13.7 %
RED BLOOD CELLS URINE: 1 /HPF
SODIUM SERPL-SCNC: 138 MMOL/L
SPECIFIC GRAVITY URINE: >1.03
TRIGL SERPL-MCNC: 107 MG/DL
TSH SERPL-ACNC: 0.48 UIU/ML
UROBILINOGEN URINE: 1 MG/DL
VIT B12 SERPL-MCNC: 498 PG/ML
WBC # FLD AUTO: 7.6 K/UL
WHITE BLOOD CELLS URINE: 0 /HPF

## 2024-10-04 ENCOUNTER — RX RENEWAL (OUTPATIENT)
Age: 74
End: 2024-10-04

## 2024-10-04 RX ORDER — LEVETIRACETAM 500 MG/1
500 TABLET, FILM COATED ORAL
Qty: 180 | Refills: 0 | Status: ACTIVE | COMMUNITY
Start: 2024-10-04 | End: 1900-01-01

## 2024-10-15 ENCOUNTER — APPOINTMENT (OUTPATIENT)
Dept: CARDIOLOGY | Facility: CLINIC | Age: 74
End: 2024-10-15

## 2024-10-15 DIAGNOSIS — E66.3 OVERWEIGHT: ICD-10-CM

## 2024-10-15 DIAGNOSIS — I73.9 PERIPHERAL VASCULAR DISEASE, UNSPECIFIED: ICD-10-CM

## 2024-10-15 DIAGNOSIS — I25.10 ATHEROSCLEROTIC HEART DISEASE OF NATIVE CORONARY ARTERY W/OUT ANGINA PECTORIS: ICD-10-CM

## 2024-10-15 DIAGNOSIS — I65.23 OCCLUSION AND STENOSIS OF BILATERAL CAROTID ARTERIES: ICD-10-CM

## 2024-10-15 DIAGNOSIS — I47.19 OTHER SUPRAVENTRICULAR TACHYCARDIA: ICD-10-CM

## 2024-11-05 ENCOUNTER — RX RENEWAL (OUTPATIENT)
Age: 74
End: 2024-11-05

## 2024-11-25 NOTE — PROCEDURE
BP is well controlled- continue lisinopril  
Continue supplement- taking 50,000 u weekly   
Continue synthroid 75 mcg daily   Check tfts   
Eating low fat diet, taking zocor 20 mg daily   Check flp   
[de-identified] : Euflexxa # 2 Left knee\par Discussed at length with the patient the planned Euflexxa injection. The risks, benefits, convalescence and alternatives were reviewed. The possible side effects discussed included but were not limited to: pain, swelling, heat and redness. There symptoms are generally mild but if they are extensive then contact the office. Giving pain relievers by mouth such as NSAID´s or Tylenol can generally treat the reactions to Euflexxa. Rare cases of infection have been noted. Rash, hives and itching may occur post injection. If you have muscle pain or cramps, flushing and or swelling of the face, rapid heart beat, nausea, dizziness, fever, chills, headache, difficulty breathing, swelling in the arms or legs, or have a prickly feeling of your skin, contact a health care provider immediately.\par \par Following this discussion, the knee was prepped with betadine and under sterile condition the Euflexxa injection was performed with a 22 gauge needle. The needle was introduced into the joint, aspiration was performed to ensure intra-articular placement and the medication was injected. Upon withdrawal of the needle the site was cleaned with alcohol and a bandaid applied. The patient tolerated the injection well and there were no adverse effects. Post injection instructions included no strenuous activity for 24 hours, cryotherapy and if there are any adverse effects to contact the office.\par

## 2025-03-05 ENCOUNTER — RX RENEWAL (OUTPATIENT)
Age: 75
End: 2025-03-05

## 2025-06-09 ENCOUNTER — RX RENEWAL (OUTPATIENT)
Age: 75
End: 2025-06-09

## 2025-07-21 ENCOUNTER — RX RENEWAL (OUTPATIENT)
Age: 75
End: 2025-07-21

## 2025-08-06 ENCOUNTER — RX RENEWAL (OUTPATIENT)
Age: 75
End: 2025-08-06

## 2025-09-05 ENCOUNTER — APPOINTMENT (OUTPATIENT)
Dept: FAMILY MEDICINE | Facility: CLINIC | Age: 75
End: 2025-09-05
Payer: MEDICARE

## 2025-09-05 VITALS
DIASTOLIC BLOOD PRESSURE: 70 MMHG | BODY MASS INDEX: 26.63 KG/M2 | HEIGHT: 70 IN | WEIGHT: 186 LBS | SYSTOLIC BLOOD PRESSURE: 120 MMHG | HEART RATE: 85 BPM | OXYGEN SATURATION: 95 %

## 2025-09-05 DIAGNOSIS — G40.909 EPILEPSY, UNSPECIFIED, NOT INTRACTABLE, W/OUT STATUS EPILEPTICUS: ICD-10-CM

## 2025-09-05 DIAGNOSIS — D75.89 OTHER SPECIFIED DISEASES OF BLOOD AND BLOOD-FORMING ORGANS: ICD-10-CM

## 2025-09-05 DIAGNOSIS — Z86.73 PERSONAL HISTORY OF TRANSIENT ISCHEMIC ATTACK (TIA), AND CEREBRAL INFARCTION W/OUT RESIDUAL DEFICITS: ICD-10-CM

## 2025-09-05 DIAGNOSIS — M89.9 DISORDER OF BONE, UNSPECIFIED: ICD-10-CM

## 2025-09-05 DIAGNOSIS — D49.6 NEOPLASM OF UNSPECIFIED BEHAVIOR OF BRAIN: ICD-10-CM

## 2025-09-05 DIAGNOSIS — Z00.00 ENCOUNTER FOR GENERAL ADULT MEDICAL EXAMINATION W/OUT ABNORMAL FINDINGS: ICD-10-CM

## 2025-09-05 DIAGNOSIS — I73.9 PERIPHERAL VASCULAR DISEASE, UNSPECIFIED: ICD-10-CM

## 2025-09-05 DIAGNOSIS — R91.8 OTHER NONSPECIFIC ABNORMAL FINDING OF LUNG FIELD: ICD-10-CM

## 2025-09-05 DIAGNOSIS — I47.19 OTHER SUPRAVENTRICULAR TACHYCARDIA: ICD-10-CM

## 2025-09-05 DIAGNOSIS — M54.50 LOW BACK PAIN, UNSPECIFIED: ICD-10-CM

## 2025-09-05 DIAGNOSIS — I65.23 OCCLUSION AND STENOSIS OF BILATERAL CAROTID ARTERIES: ICD-10-CM

## 2025-09-05 DIAGNOSIS — R73.03 PREDIABETES.: ICD-10-CM

## 2025-09-05 DIAGNOSIS — Z13.29 ENCOUNTER FOR SCREENING FOR OTHER SUSPECTED ENDOCRINE DISORDER: ICD-10-CM

## 2025-09-05 DIAGNOSIS — Z13.220 ENCOUNTER FOR SCREENING FOR LIPOID DISORDERS: ICD-10-CM

## 2025-09-05 DIAGNOSIS — I25.10 ATHEROSCLEROTIC HEART DISEASE OF NATIVE CORONARY ARTERY W/OUT ANGINA PECTORIS: ICD-10-CM

## 2025-09-05 DIAGNOSIS — Z12.11 ENCOUNTER FOR SCREENING FOR MALIGNANT NEOPLASM OF COLON: ICD-10-CM

## 2025-09-05 DIAGNOSIS — R97.20 ELEVATED PROSTATE, SPECIFIC ANTIGEN [PSA]: ICD-10-CM

## 2025-09-05 DIAGNOSIS — R74.8 ABNORMAL LEVELS OF OTHER SERUM ENZYMES: ICD-10-CM

## 2025-09-05 DIAGNOSIS — G89.29 LOW BACK PAIN, UNSPECIFIED: ICD-10-CM

## 2025-09-05 DIAGNOSIS — Z12.2 ENCOUNTER FOR SCREENING FOR MALIGNANT NEOPLASM OF RESPIRATORY ORGANS: ICD-10-CM

## 2025-09-05 DIAGNOSIS — Z87.39 PERSONAL HISTORY OF OTHER DISEASES OF THE MUSCULOSKELETAL SYSTEM AND CONNECTIVE TISSUE: ICD-10-CM

## 2025-09-05 DIAGNOSIS — J44.9 CHRONIC OBSTRUCTIVE PULMONARY DISEASE, UNSPECIFIED: ICD-10-CM

## 2025-09-05 PROCEDURE — G0439: CPT

## 2025-09-05 RX ORDER — ACETAMINOPHEN 500 MG/1
500 TABLET ORAL
Qty: 180 | Refills: 0 | Status: ACTIVE | COMMUNITY
Start: 2025-09-05

## 2025-09-05 RX ORDER — ALBUTEROL SULFATE 90 UG/1
108 (90 BASE) INHALANT RESPIRATORY (INHALATION) EVERY 4 HOURS
Qty: 1 | Refills: 3 | Status: ACTIVE | COMMUNITY
Start: 2025-09-05 | End: 1900-01-01

## 2025-09-06 LAB
ALBUMIN SERPL ELPH-MCNC: 4.6 G/DL
ALP BLD-CCNC: 158 U/L
ALT SERPL-CCNC: 25 U/L
ANION GAP SERPL CALC-SCNC: 16 MMOL/L
AST SERPL-CCNC: 33 U/L
BASOPHILS # BLD AUTO: 0.03 K/UL
BASOPHILS NFR BLD AUTO: 0.5 %
BILIRUB SERPL-MCNC: 0.4 MG/DL
BUN SERPL-MCNC: 15 MG/DL
CALCIUM SERPL-MCNC: 9.9 MG/DL
CHLORIDE SERPL-SCNC: 105 MMOL/L
CHOLEST SERPL-MCNC: 125 MG/DL
CO2 SERPL-SCNC: 20 MMOL/L
CREAT SERPL-MCNC: 1.06 MG/DL
EGFRCR SERPLBLD CKD-EPI 2021: 74 ML/MIN/1.73M2
EOSINOPHIL # BLD AUTO: 0.21 K/UL
EOSINOPHIL NFR BLD AUTO: 3.6 %
ESTIMATED AVERAGE GLUCOSE: 114 MG/DL
GGT SERPL-CCNC: 49 U/L
GLUCOSE SERPL-MCNC: 108 MG/DL
HBA1C MFR BLD HPLC: 5.6 %
HCT VFR BLD CALC: 45.4 %
HDLC SERPL-MCNC: 52 MG/DL
HGB BLD-MCNC: 14.5 G/DL
IMM GRANULOCYTES NFR BLD AUTO: 0.3 %
LDLC SERPL-MCNC: 53 MG/DL
LYMPHOCYTES # BLD AUTO: 1.5 K/UL
LYMPHOCYTES NFR BLD AUTO: 26 %
MAN DIFF?: NORMAL
MCHC RBC-ENTMCNC: 31.9 G/DL
MCHC RBC-ENTMCNC: 35 PG
MCV RBC AUTO: 109.7 FL
MONOCYTES # BLD AUTO: 0.62 K/UL
MONOCYTES NFR BLD AUTO: 10.7 %
NEUTROPHILS # BLD AUTO: 3.4 K/UL
NEUTROPHILS NFR BLD AUTO: 58.9 %
NONHDLC SERPL-MCNC: 73 MG/DL
PLATELET # BLD AUTO: 174 K/UL
POTASSIUM SERPL-SCNC: 4.6 MMOL/L
PROT SERPL-MCNC: 7.4 G/DL
PSA FREE FLD-MCNC: 23 %
PSA FREE SERPL-MCNC: 0.96 NG/ML
PSA SERPL-MCNC: 4.17 NG/ML
RBC # BLD: 4.14 M/UL
RBC # FLD: 14.3 %
SODIUM SERPL-SCNC: 141 MMOL/L
TRIGL SERPL-MCNC: 112 MG/DL
TSH SERPL-ACNC: 0.6 UIU/ML
WBC # FLD AUTO: 5.78 K/UL

## 2025-09-08 LAB — 5NT SERPL-CCNC: 4 IU/L

## 2025-09-10 LAB
ALP BLD-CCNC: 152 IU/L
ALP BONE CFR SERPL: 42 %
ALP INTEST CFR SERPL: 2 %
ALP LIVER CFR SERPL: 56 %